# Patient Record
Sex: FEMALE | Race: WHITE | NOT HISPANIC OR LATINO | Employment: OTHER
[De-identification: names, ages, dates, MRNs, and addresses within clinical notes are randomized per-mention and may not be internally consistent; named-entity substitution may affect disease eponyms.]

---

## 2019-09-24 ENCOUNTER — HOSPITAL (OUTPATIENT)
Dept: OTHER | Age: 84
End: 2019-09-24

## 2020-01-03 ENCOUNTER — HOSPITAL (OUTPATIENT)
Dept: OTHER | Age: 85
End: 2020-01-03

## 2020-01-03 ENCOUNTER — DIAGNOSTIC TRANS (OUTPATIENT)
Dept: OTHER | Age: 85
End: 2020-01-03

## 2020-01-03 LAB
AMORPH SED URNS QL MICRO: ABNORMAL
ANALYZER ANC (IANC): ABNORMAL
ANION GAP SERPL CALC-SCNC: 10 MMOL/L (ref 10–20)
APPEARANCE UR: ABNORMAL
BACTERIA #/AREA URNS HPF: ABNORMAL /HPF
BASOPHILS # BLD: 0.1 K/MCL (ref 0–0.3)
BASOPHILS NFR BLD: 1 %
BILIRUB UR QL: NEGATIVE
BUN SERPL-MCNC: 24 MG/DL (ref 6–20)
BUN/CREAT SERPL: 24 (ref 7–25)
CALCIUM SERPL-MCNC: 8.8 MG/DL (ref 8.4–10.2)
CAOX CRY URNS QL MICRO: ABNORMAL
CHLORIDE SERPL-SCNC: 108 MMOL/L (ref 98–107)
CK SERPL-CCNC: 1736 UNITS/L (ref 26–192)
CK SERPL-CCNC: ABNORMAL U/L
CO2 SERPL-SCNC: 23 MMOL/L (ref 21–32)
COLOR UR: YELLOW
CREAT SERPL-MCNC: 1.02 MG/DL (ref 0.51–0.95)
DIFFERENTIAL METHOD BLD: ABNORMAL
EOSINOPHIL # BLD: 0.1 K/MCL (ref 0.1–0.5)
EOSINOPHIL NFR BLD: 1 %
EPITH CASTS #/AREA URNS LPF: ABNORMAL /[LPF]
ERYTHROCYTE [DISTWIDTH] IN BLOOD: 14.5 % (ref 11–15)
FATTY CASTS #/AREA URNS LPF: ABNORMAL /[LPF]
GLUCOSE SERPL-MCNC: 104 MG/DL (ref 65–99)
GLUCOSE UR-MCNC: NEGATIVE MG/DL
GRAN CASTS #/AREA URNS LPF: ABNORMAL /[LPF]
HCT VFR BLD CALC: 44.8 % (ref 36–46.5)
HGB BLD-MCNC: 14.4 G/DL (ref 12–15.5)
HGB UR QL: ABNORMAL
HYALINE CASTS #/AREA URNS LPF: ABNORMAL /LPF (ref 0–5)
IMM GRANULOCYTES # BLD AUTO: 0 K/MCL (ref 0–0.2)
IMM GRANULOCYTES NFR BLD: 0 %
KETONES UR-MCNC: NEGATIVE MG/DL
LEUKOCYTE ESTERASE UR QL STRIP: ABNORMAL
LEUKOCYTE ESTERASE UR QL STRIP: ABNORMAL
LYMPHOCYTES # BLD: 2.1 K/MCL (ref 1–4)
LYMPHOCYTES NFR BLD: 23 %
MCH RBC QN AUTO: 31.4 PG (ref 26–34)
MCHC RBC AUTO-ENTMCNC: 32.1 G/DL (ref 32–36.5)
MCV RBC AUTO: 97.6 FL (ref 78–100)
MICROSCOPIC (MT): ABNORMAL
MIXED CELL CASTS #/AREA URNS LPF: ABNORMAL /[LPF]
MONOCYTES # BLD: 1.3 K/MCL (ref 0.3–0.9)
MONOCYTES NFR BLD: 14 %
MUCOUS THREADS URNS QL MICRO: ABNORMAL
NEUTROPHILS # BLD: 5.6 K/MCL (ref 1.8–7.7)
NEUTROPHILS NFR BLD: 61 %
NEUTS SEG NFR BLD: ABNORMAL %
NITRITE UR QL: ABNORMAL
NITRITE UR QL: POSITIVE
NRBC (NRBCRE): 0 /100 WBC
PH UR: 5 UNITS (ref 5–7)
PLATELET # BLD: 251 K/MCL (ref 140–450)
POTASSIUM SERPL-SCNC: 4.6 MMOL/L (ref 3.4–5.1)
POTASSIUM SERPL-SCNC: ABNORMAL MMOL/L
PROT UR QL: NEGATIVE MG/DL
RBC # BLD: 4.59 MIL/MCL (ref 4–5.2)
RBC #/AREA URNS HPF: ABNORMAL /HPF (ref 0–2)
RBC CASTS #/AREA URNS LPF: ABNORMAL /[LPF]
RENAL EPI CELLS #/AREA URNS HPF: ABNORMAL /[HPF]
SODIUM SERPL-SCNC: 136 MMOL/L (ref 135–145)
SP GR UR: 1.01 (ref 1–1.03)
SPECIMEN SOURCE: ABNORMAL
SPERM URNS QL MICRO: ABNORMAL
SQUAMOUS #/AREA URNS HPF: ABNORMAL /HPF (ref 0–5)
T VAGINALIS URNS QL MICRO: ABNORMAL
TRI-PHOS CRY URNS QL MICRO: ABNORMAL
TROPONIN I SERPL HS-MCNC: 0.09 NG/ML
TROPONIN I SERPL HS-MCNC: ABNORMAL NG/L
TROPONIN I SERPL HS-MCNC: ABNORMAL NG/L
URATE CRY URNS QL MICRO: ABNORMAL
URNS CMNT MICRO: ABNORMAL
UROBILINOGEN UR QL: 0.2 MG/DL (ref 0–1)
WAXY CASTS #/AREA URNS LPF: ABNORMAL /[LPF]
WBC # BLD: 9.2 K/MCL (ref 4.2–11)
WBC #/AREA URNS HPF: ABNORMAL /HPF (ref 0–5)
WBC #/AREA URNS HPF: ABNORMAL /[HPF]
WBC CASTS #/AREA URNS LPF: ABNORMAL /[LPF]
YEAST HYPHAE URNS QL MICRO: ABNORMAL
YEAST URNS QL MICRO: ABNORMAL

## 2020-01-08 LAB
BACTERIA BLD CULT: NORMAL

## 2022-01-19 ENCOUNTER — APPOINTMENT (OUTPATIENT)
Dept: CT IMAGING | Age: 87
DRG: 193 | End: 2022-01-19
Attending: EMERGENCY MEDICINE

## 2022-01-19 ENCOUNTER — APPOINTMENT (OUTPATIENT)
Dept: GENERAL RADIOLOGY | Age: 87
DRG: 193 | End: 2022-01-19
Attending: EMERGENCY MEDICINE

## 2022-01-19 ENCOUNTER — HOSPITAL ENCOUNTER (INPATIENT)
Age: 87
LOS: 2 days | Discharge: HOME | DRG: 193 | End: 2022-01-21
Attending: EMERGENCY MEDICINE | Admitting: INTERNAL MEDICINE

## 2022-01-19 DIAGNOSIS — E87.1 HYPONATREMIA: ICD-10-CM

## 2022-01-19 DIAGNOSIS — B33.8 RSV INFECTION: Primary | ICD-10-CM

## 2022-01-19 LAB
ALBUMIN SERPL-MCNC: 3.3 G/DL (ref 3.6–5.1)
ALBUMIN/GLOB SERPL: 0.8 {RATIO} (ref 1–2.4)
ALP SERPL-CCNC: 84 UNITS/L (ref 45–117)
ALT SERPL-CCNC: 26 UNITS/L
ANION GAP SERPL CALC-SCNC: 12 MMOL/L (ref 10–20)
APPEARANCE UR: CLEAR
AST SERPL-CCNC: 38 UNITS/L
BACTERIA #/AREA URNS HPF: ABNORMAL /HPF
BASOPHILS # BLD: 0.1 K/MCL (ref 0–0.3)
BASOPHILS NFR BLD: 1 %
BILIRUB SERPL-MCNC: 0.4 MG/DL (ref 0.2–1)
BILIRUB UR QL STRIP: NEGATIVE
BUN SERPL-MCNC: 22 MG/DL (ref 6–20)
BUN/CREAT SERPL: 19 (ref 7–25)
CALCIUM SERPL-MCNC: 9.1 MG/DL (ref 8.4–10.2)
CHLORIDE SERPL-SCNC: 96 MMOL/L (ref 98–107)
CO2 SERPL-SCNC: 26 MMOL/L (ref 21–32)
COLOR UR: YELLOW
CREAT SERPL-MCNC: 1.15 MG/DL (ref 0.51–0.95)
DEPRECATED RDW RBC: 50.4 FL (ref 39–50)
EOSINOPHIL # BLD: 0.1 K/MCL (ref 0–0.5)
EOSINOPHIL NFR BLD: 1 %
ERYTHROCYTE [DISTWIDTH] IN BLOOD: 14.1 % (ref 11–15)
FASTING DURATION TIME PATIENT: ABNORMAL H
FLUAV RNA RESP QL NAA+PROBE: NOT DETECTED
FLUBV RNA RESP QL NAA+PROBE: NOT DETECTED
GFR SERPLBLD BASED ON 1.73 SQ M-ARVRAT: 40 ML/MIN
GLOBULIN SER-MCNC: 4 G/DL (ref 2–4)
GLUCOSE SERPL-MCNC: 130 MG/DL (ref 70–99)
GLUCOSE UR STRIP-MCNC: NEGATIVE MG/DL
HCT VFR BLD CALC: 42.1 % (ref 36–46.5)
HGB BLD-MCNC: 13.5 G/DL (ref 12–15.5)
HGB UR QL STRIP: NEGATIVE
HYALINE CASTS #/AREA URNS LPF: ABNORMAL /LPF
IMM GRANULOCYTES # BLD AUTO: 0.1 K/MCL (ref 0–0.2)
IMM GRANULOCYTES # BLD: 1 %
KETONES UR STRIP-MCNC: NEGATIVE MG/DL
LACTATE BLDV-SCNC: 1.7 MMOL/L
LEUKOCYTE ESTERASE UR QL STRIP: NEGATIVE
LYMPHOCYTES # BLD: 1.1 K/MCL (ref 1–4)
LYMPHOCYTES NFR BLD: 10 %
MCH RBC QN AUTO: 31.4 PG (ref 26–34)
MCHC RBC AUTO-ENTMCNC: 32.1 G/DL (ref 32–36.5)
MCV RBC AUTO: 97.9 FL (ref 78–100)
MONOCYTES # BLD: 1.9 K/MCL (ref 0.3–0.9)
MONOCYTES NFR BLD: 17 %
MUCOUS THREADS URNS QL MICRO: PRESENT
NEUTROPHILS # BLD: 8.3 K/MCL (ref 1.8–7.7)
NEUTROPHILS NFR BLD: 70 %
NITRITE UR QL STRIP: NEGATIVE
NRBC BLD MANUAL-RTO: 0 /100 WBC
PH UR STRIP: 6 [PH] (ref 5–7)
PLATELET # BLD AUTO: 271 K/MCL (ref 140–450)
POTASSIUM SERPL-SCNC: 5 MMOL/L (ref 3.4–5.1)
PROT SERPL-MCNC: 7.3 G/DL (ref 6.4–8.2)
PROT UR STRIP-MCNC: 30 MG/DL
RBC # BLD: 4.3 MIL/MCL (ref 4–5.2)
RBC #/AREA URNS HPF: ABNORMAL /HPF
RSV AG NPH QL IA.RAPID: DETECTED
SARS-COV-2 RNA RESP QL NAA+PROBE: NOT DETECTED
SARS-COV-2 RNA RESP QL NAA+PROBE: NOT DETECTED
SERVICE CMNT-IMP: ABNORMAL
SERVICE CMNT-IMP: ABNORMAL
SERVICE CMNT-IMP: NORMAL
SERVICE CMNT-IMP: NORMAL
SODIUM SERPL-SCNC: 129 MMOL/L (ref 135–145)
SP GR UR STRIP: 1.02 (ref 1–1.03)
SQUAMOUS #/AREA URNS HPF: ABNORMAL /HPF
UROBILINOGEN UR STRIP-MCNC: 0.2 MG/DL
WBC # BLD: 11.7 K/MCL (ref 4.2–11)
WBC #/AREA URNS HPF: ABNORMAL /HPF

## 2022-01-19 PROCEDURE — 87635 SARS-COV-2 COVID-19 AMP PRB: CPT | Performed by: EMERGENCY MEDICINE

## 2022-01-19 PROCEDURE — 10004281 HB COUNTER-STAFF TIME PER 15 MIN

## 2022-01-19 PROCEDURE — 87807 RSV ASSAY W/OPTIC: CPT | Performed by: EMERGENCY MEDICINE

## 2022-01-19 PROCEDURE — 93005 ELECTROCARDIOGRAM TRACING: CPT | Performed by: EMERGENCY MEDICINE

## 2022-01-19 PROCEDURE — 10004651 HB RX, NO CHARGE ITEM: Performed by: EMERGENCY MEDICINE

## 2022-01-19 PROCEDURE — 10002807 HB RX 258: Performed by: EMERGENCY MEDICINE

## 2022-01-19 PROCEDURE — 85025 COMPLETE CBC W/AUTO DIFF WBC: CPT | Performed by: EMERGENCY MEDICINE

## 2022-01-19 PROCEDURE — 81001 URINALYSIS AUTO W/SCOPE: CPT | Performed by: EMERGENCY MEDICINE

## 2022-01-19 PROCEDURE — 99285 EMERGENCY DEPT VISIT HI MDM: CPT

## 2022-01-19 PROCEDURE — 71250 CT THORAX DX C-: CPT

## 2022-01-19 PROCEDURE — 0241U COVID/FLU/RSV PANEL: CPT | Performed by: EMERGENCY MEDICINE

## 2022-01-19 PROCEDURE — 87040 BLOOD CULTURE FOR BACTERIA: CPT | Performed by: EMERGENCY MEDICINE

## 2022-01-19 PROCEDURE — 83605 ASSAY OF LACTIC ACID: CPT

## 2022-01-19 PROCEDURE — 36415 COLL VENOUS BLD VENIPUNCTURE: CPT

## 2022-01-19 PROCEDURE — 70450 CT HEAD/BRAIN W/O DYE: CPT

## 2022-01-19 PROCEDURE — 10003585 HB ROOM CHARGE INTERMEDIATE CARE

## 2022-01-19 PROCEDURE — 10002803 HB RX 637: Performed by: INTERNAL MEDICINE

## 2022-01-19 PROCEDURE — 71046 X-RAY EXAM CHEST 2 VIEWS: CPT

## 2022-01-19 PROCEDURE — 80053 COMPREHEN METABOLIC PANEL: CPT | Performed by: EMERGENCY MEDICINE

## 2022-01-19 PROCEDURE — C9803 HOPD COVID-19 SPEC COLLECT: HCPCS

## 2022-01-19 RX ORDER — LEVOTHYROXINE SODIUM 0.07 MG/1
75 TABLET ORAL EVERY OTHER DAY
COMMUNITY

## 2022-01-19 RX ORDER — LEVOTHYROXINE SODIUM 0.07 MG/1
75 TABLET ORAL
Status: DISCONTINUED | OUTPATIENT
Start: 2022-01-20 | End: 2022-01-20

## 2022-01-19 RX ORDER — IBUPROFEN 400 MG/1
400 TABLET ORAL EVERY 6 HOURS PRN
Status: ON HOLD | COMMUNITY
End: 2022-01-21 | Stop reason: HOSPADM

## 2022-01-19 RX ORDER — ALBUTEROL SULFATE 1.25 MG/3ML
1.25 SOLUTION RESPIRATORY (INHALATION)
Status: DISCONTINUED | OUTPATIENT
Start: 2022-01-19 | End: 2022-01-21 | Stop reason: HOSPADM

## 2022-01-19 RX ORDER — ACETAMINOPHEN 500 MG
1000 TABLET ORAL ONCE
Status: COMPLETED | OUTPATIENT
Start: 2022-01-19 | End: 2022-01-19

## 2022-01-19 RX ORDER — LEVOTHYROXINE SODIUM 0.05 MG/1
50 TABLET ORAL EVERY OTHER DAY
COMMUNITY
Start: 2021-10-16

## 2022-01-19 RX ORDER — LISINOPRIL 10 MG/1
10 TABLET ORAL DAILY
COMMUNITY
Start: 2021-11-04

## 2022-01-19 RX ORDER — LISINOPRIL 10 MG/1
10 TABLET ORAL DAILY
Status: DISCONTINUED | OUTPATIENT
Start: 2022-01-20 | End: 2022-01-20

## 2022-01-19 RX ADMIN — SODIUM CHLORIDE 500 ML: 0.9 INJECTION, SOLUTION INTRAVENOUS at 18:56

## 2022-01-19 RX ADMIN — ACETAMINOPHEN 1000 MG: 500 TABLET ORAL at 18:55

## 2022-01-19 RX ADMIN — GUAIFENESIN AND DEXTROMETHORPHAN HYDROBROMIDE 1 TABLET: 600; 30 TABLET, EXTENDED RELEASE ORAL at 23:29

## 2022-01-19 ASSESSMENT — COLUMBIA-SUICIDE SEVERITY RATING SCALE - C-SSRS
6. HAVE YOU EVER DONE ANYTHING, STARTED TO DO ANYTHING, OR PREPARED TO DO ANYTHING TO END YOUR LIFE?: NO
1. WITHIN THE PAST MONTH, HAVE YOU WISHED YOU WERE DEAD OR WISHED YOU COULD GO TO SLEEP AND NOT WAKE UP?: NO
2. HAVE YOU ACTUALLY HAD ANY THOUGHTS OF KILLING YOURSELF?: NO
IS THE PATIENT ABLE TO COMPLETE C-SSRS: YES

## 2022-01-19 ASSESSMENT — LIFESTYLE VARIABLES
HOW OFTEN DO YOU HAVE 6 OR MORE DRINKS ON ONE OCCASION: NEVER
ALCOHOL_USE_STATUS: NO OR LOW RISK WITH VALIDATED TOOL
AUDIT-C TOTAL SCORE: 0
HOW OFTEN DO YOU HAVE A DRINK CONTAINING ALCOHOL: NEVER
HOW MANY STANDARD DRINKS CONTAINING ALCOHOL DO YOU HAVE ON A TYPICAL DAY: 0,1 OR 2

## 2022-01-19 ASSESSMENT — PAIN SCALES - GENERAL: PAINLEVEL_OUTOF10: 0

## 2022-01-19 ASSESSMENT — PATIENT HEALTH QUESTIONNAIRE - PHQ9
2. FEELING DOWN, DEPRESSED OR HOPELESS: NOT AT ALL
SUM OF ALL RESPONSES TO PHQ9 QUESTIONS 1 AND 2: 0
SUM OF ALL RESPONSES TO PHQ9 QUESTIONS 1 AND 2: 0
1. LITTLE INTEREST OR PLEASURE IN DOING THINGS: NOT AT ALL
IS PATIENT ABLE TO COMPLETE PHQ2 OR PHQ9: YES
CLINICAL INTERPRETATION OF PHQ2 SCORE: NO FURTHER SCREENING NEEDED

## 2022-01-19 ASSESSMENT — ENCOUNTER SYMPTOMS
ABDOMINAL PAIN: 0
SHORTNESS OF BREATH: 0
NUMBNESS: 0
COUGH: 1
CONFUSION: 1
EYE PAIN: 0
DIZZINESS: 0
VOMITING: 0
SINUS PAIN: 0
WOUND: 0
CHEST TIGHTNESS: 0
DIARRHEA: 0
WEAKNESS: 0
FEVER: 1
BACK PAIN: 0

## 2022-01-19 ASSESSMENT — ACTIVITIES OF DAILY LIVING (ADL)
RECENT_DECLINE_ADL: YES, ACUTE ILLNESS WITHOUT THERAPY NEEDS
CHRONIC_PAIN_PRESENT: NO
ADL_BEFORE_ADMISSION: INDEPENDENT
ADL_SCORE: 12
ADL_SHORT_OF_BREATH: NO

## 2022-01-19 ASSESSMENT — COGNITIVE AND FUNCTIONAL STATUS - GENERAL
BECAUSE OF A PHYSICAL, MENTAL, OR EMOTIONAL CONDITION, DO YOU HAVE SERIOUS DIFFICULTY CONCENTRATING, REMEMBERING OR MAKING DECISIONS: YES
BECAUSE OF A PHYSICAL, MENTAL, OR EMOTIONAL CONDITION, DO YOU HAVE DIFFICULTY DOING ERRANDS ALONE: YES
ARE YOU DEAF OR DO YOU HAVE SERIOUS DIFFICULTY  HEARING: YES
ARE YOU BLIND OR DO YOU HAVE SERIOUS DIFFICULTY SEEING, EVEN WHEN WEARING GLASSES: NO
DO YOU HAVE SERIOUS DIFFICULTY WALKING OR CLIMBING STAIRS: NO
DO YOU HAVE DIFFICULTY DRESSING OR BATHING: NO

## 2022-01-20 ENCOUNTER — CASE MANAGEMENT (OUTPATIENT)
Dept: CARE COORDINATION | Age: 87
End: 2022-01-20

## 2022-01-20 LAB
ANION GAP SERPL CALC-SCNC: 10 MMOL/L (ref 10–20)
ATRIAL RATE (BPM): 88
BASOPHILS # BLD: 0.1 K/MCL (ref 0–0.3)
BASOPHILS NFR BLD: 1 %
BUN SERPL-MCNC: 20 MG/DL (ref 6–20)
BUN/CREAT SERPL: 22 (ref 7–25)
CALCIUM SERPL-MCNC: 8.5 MG/DL (ref 8.4–10.2)
CHLORIDE SERPL-SCNC: 100 MMOL/L (ref 98–107)
CO2 SERPL-SCNC: 26 MMOL/L (ref 21–32)
CREAT SERPL-MCNC: 0.93 MG/DL (ref 0.51–0.95)
DEPRECATED RDW RBC: 50.7 FL (ref 39–50)
EOSINOPHIL # BLD: 0.3 K/MCL (ref 0–0.5)
EOSINOPHIL NFR BLD: 3 %
ERYTHROCYTE [DISTWIDTH] IN BLOOD: 14.1 % (ref 11–15)
FASTING DURATION TIME PATIENT: ABNORMAL H
GFR SERPLBLD BASED ON 1.73 SQ M-ARVRAT: 52 ML/MIN
GLUCOSE SERPL-MCNC: 100 MG/DL (ref 70–99)
HCT VFR BLD CALC: 36.7 % (ref 36–46.5)
HGB BLD-MCNC: 11.9 G/DL (ref 12–15.5)
IMM GRANULOCYTES # BLD AUTO: 0.1 K/MCL (ref 0–0.2)
IMM GRANULOCYTES # BLD: 1 %
LYMPHOCYTES # BLD: 1.5 K/MCL (ref 1–4)
LYMPHOCYTES NFR BLD: 17 %
MCH RBC QN AUTO: 31.6 PG (ref 26–34)
MCHC RBC AUTO-ENTMCNC: 32.4 G/DL (ref 32–36.5)
MCV RBC AUTO: 97.6 FL (ref 78–100)
MONOCYTES # BLD: 1.6 K/MCL (ref 0.3–0.9)
MONOCYTES NFR BLD: 20 %
NEUTROPHILS # BLD: 4.8 K/MCL (ref 1.8–7.7)
NEUTROPHILS NFR BLD: 58 %
NRBC BLD MANUAL-RTO: 0 /100 WBC
P AXIS (DEGREES): 4
PLATELET # BLD AUTO: 236 K/MCL (ref 140–450)
POTASSIUM SERPL-SCNC: 3.8 MMOL/L (ref 3.4–5.1)
PR-INTERVAL (MSEC): 172
QRS-INTERVAL (MSEC): 114
QT-INTERVAL (MSEC): 380
QTC: 460
R AXIS (DEGREES): -12
RBC # BLD: 3.76 MIL/MCL (ref 4–5.2)
REPORT TEXT: NORMAL
SODIUM SERPL-SCNC: 132 MMOL/L (ref 135–145)
T AXIS (DEGREES): 13
VENTRICULAR RATE EKG/MIN (BPM): 88
WBC # BLD: 8.3 K/MCL (ref 4.2–11)

## 2022-01-20 PROCEDURE — 10003585 HB ROOM CHARGE INTERMEDIATE CARE

## 2022-01-20 PROCEDURE — 10002803 HB RX 637: Performed by: INTERNAL MEDICINE

## 2022-01-20 PROCEDURE — 36415 COLL VENOUS BLD VENIPUNCTURE: CPT | Performed by: INTERNAL MEDICINE

## 2022-01-20 PROCEDURE — 13003289 HB OXYGEN THERAPY DAILY

## 2022-01-20 PROCEDURE — 85025 COMPLETE CBC W/AUTO DIFF WBC: CPT | Performed by: INTERNAL MEDICINE

## 2022-01-20 PROCEDURE — 80048 BASIC METABOLIC PNL TOTAL CA: CPT | Performed by: INTERNAL MEDICINE

## 2022-01-20 RX ORDER — MULTIVITAMIN
1 TABLET ORAL DAILY
COMMUNITY

## 2022-01-20 RX ORDER — LISINOPRIL 5 MG/1
5 TABLET ORAL DAILY
COMMUNITY

## 2022-01-20 RX ORDER — LEVOTHYROXINE SODIUM 0.05 MG/1
50 TABLET ORAL
Status: DISCONTINUED | OUTPATIENT
Start: 2022-01-21 | End: 2022-01-21 | Stop reason: HOSPADM

## 2022-01-20 RX ORDER — LEVOTHYROXINE SODIUM 0.07 MG/1
75 TABLET ORAL
Status: DISCONTINUED | OUTPATIENT
Start: 2022-01-20 | End: 2022-01-21 | Stop reason: HOSPADM

## 2022-01-20 RX ADMIN — LISINOPRIL 15 MG: 5 TABLET ORAL at 10:51

## 2022-01-20 RX ADMIN — GUAIFENESIN AND DEXTROMETHORPHAN HYDROBROMIDE 1 TABLET: 600; 30 TABLET, EXTENDED RELEASE ORAL at 10:51

## 2022-01-20 RX ADMIN — GUAIFENESIN AND DEXTROMETHORPHAN HYDROBROMIDE 1 TABLET: 600; 30 TABLET, EXTENDED RELEASE ORAL at 20:24

## 2022-01-20 RX ADMIN — Medication 125 MG: at 18:47

## 2022-01-20 RX ADMIN — LEVOTHYROXINE SODIUM 75 MCG: 75 TABLET ORAL at 12:30

## 2022-01-20 RX ADMIN — Medication 125 MG: at 20:24

## 2022-01-20 ASSESSMENT — PAIN SCALES - GENERAL
PAINLEVEL_OUTOF10: 0
PAINLEVEL_OUTOF10: 0

## 2022-01-21 VITALS
BODY MASS INDEX: 31.8 KG/M2 | HEIGHT: 64 IN | SYSTOLIC BLOOD PRESSURE: 123 MMHG | RESPIRATION RATE: 18 BRPM | WEIGHT: 186.29 LBS | OXYGEN SATURATION: 91 % | DIASTOLIC BLOOD PRESSURE: 72 MMHG | HEART RATE: 87 BPM | TEMPERATURE: 97.2 F

## 2022-01-21 LAB
BASOPHILS # BLD: 0.1 K/MCL (ref 0–0.3)
BASOPHILS NFR BLD: 1 %
DEPRECATED RDW RBC: 50.4 FL (ref 39–50)
EOSINOPHIL # BLD: 0.2 K/MCL (ref 0–0.5)
EOSINOPHIL NFR BLD: 2 %
ERYTHROCYTE [DISTWIDTH] IN BLOOD: 14 % (ref 11–15)
HCT VFR BLD CALC: 43.3 % (ref 36–46.5)
HGB BLD-MCNC: 14 G/DL (ref 12–15.5)
IMM GRANULOCYTES # BLD AUTO: 0.1 K/MCL (ref 0–0.2)
IMM GRANULOCYTES # BLD: 1 %
LYMPHOCYTES # BLD: 2.6 K/MCL (ref 1–4)
LYMPHOCYTES NFR BLD: 22 %
MCH RBC QN AUTO: 31.4 PG (ref 26–34)
MCHC RBC AUTO-ENTMCNC: 32.3 G/DL (ref 32–36.5)
MCV RBC AUTO: 97.1 FL (ref 78–100)
MONOCYTES # BLD: 1.6 K/MCL (ref 0.3–0.9)
MONOCYTES NFR BLD: 14 %
NEUTROPHILS # BLD: 7.1 K/MCL (ref 1.8–7.7)
NEUTROPHILS NFR BLD: 60 %
NRBC BLD MANUAL-RTO: 0 /100 WBC
PLATELET # BLD AUTO: 265 K/MCL (ref 140–450)
RAINBOW EXTRA TUBES HOLD SPECIMEN: NORMAL
RBC # BLD: 4.46 MIL/MCL (ref 4–5.2)
WBC # BLD: 11.7 K/MCL (ref 4.2–11)

## 2022-01-21 PROCEDURE — 13003289 HB OXYGEN THERAPY DAILY

## 2022-01-21 PROCEDURE — 36415 COLL VENOUS BLD VENIPUNCTURE: CPT | Performed by: INTERNAL MEDICINE

## 2022-01-21 PROCEDURE — 10002803 HB RX 637: Performed by: INTERNAL MEDICINE

## 2022-01-21 PROCEDURE — 85025 COMPLETE CBC W/AUTO DIFF WBC: CPT | Performed by: INTERNAL MEDICINE

## 2022-01-21 RX ADMIN — GUAIFENESIN AND DEXTROMETHORPHAN HYDROBROMIDE 1 TABLET: 600; 30 TABLET, EXTENDED RELEASE ORAL at 08:37

## 2022-01-21 RX ADMIN — Medication 125 MG: at 08:36

## 2022-01-21 RX ADMIN — LEVOTHYROXINE SODIUM 50 MCG: 50 TABLET ORAL at 05:54

## 2022-01-21 RX ADMIN — LISINOPRIL 15 MG: 5 TABLET ORAL at 08:37

## 2022-01-21 ASSESSMENT — PAIN SCALES - GENERAL: PAINLEVEL_OUTOF10: 0

## 2022-01-24 ENCOUNTER — CASE MANAGEMENT (OUTPATIENT)
Dept: CARE COORDINATION | Age: 87
End: 2022-01-24

## 2022-01-24 LAB
BACTERIA BLD CULT: NORMAL
BACTERIA BLD CULT: NORMAL

## 2022-01-26 ENCOUNTER — CASE MANAGEMENT (OUTPATIENT)
Dept: CARE COORDINATION | Age: 87
End: 2022-01-26

## 2022-01-28 ENCOUNTER — CASE MANAGEMENT (OUTPATIENT)
Dept: CARE COORDINATION | Age: 87
End: 2022-01-28

## 2022-01-31 ENCOUNTER — CASE MANAGEMENT (OUTPATIENT)
Dept: CARE COORDINATION | Age: 87
End: 2022-01-31

## 2022-08-18 ENCOUNTER — HOSPITAL ENCOUNTER (INPATIENT)
Age: 87
LOS: 2 days | Discharge: NURSING, REHAB OR LONG-TERM CARE | DRG: 206 | End: 2022-08-21
Attending: EMERGENCY MEDICINE | Admitting: INTERNAL MEDICINE

## 2022-08-18 ENCOUNTER — APPOINTMENT (OUTPATIENT)
Dept: GENERAL RADIOLOGY | Age: 87
DRG: 206 | End: 2022-08-18
Attending: EMERGENCY MEDICINE

## 2022-08-18 ENCOUNTER — APPOINTMENT (OUTPATIENT)
Dept: CT IMAGING | Age: 87
DRG: 206 | End: 2022-08-18
Attending: EMERGENCY MEDICINE

## 2022-08-18 DIAGNOSIS — R52 INTRACTABLE PAIN: ICD-10-CM

## 2022-08-18 DIAGNOSIS — S22.32XA CLOSED FRACTURE OF ONE RIB OF LEFT SIDE, INITIAL ENCOUNTER: Primary | ICD-10-CM

## 2022-08-18 LAB
ANION GAP SERPL CALC-SCNC: 9 MMOL/L (ref 7–19)
BASOPHILS # BLD: 0.1 K/MCL (ref 0–0.3)
BASOPHILS NFR BLD: 1 %
BUN SERPL-MCNC: 26 MG/DL (ref 6–20)
BUN/CREAT SERPL: 25 (ref 7–25)
CALCIUM SERPL-MCNC: 9.3 MG/DL (ref 8.4–10.2)
CHLORIDE SERPL-SCNC: 102 MMOL/L (ref 97–110)
CO2 SERPL-SCNC: 28 MMOL/L (ref 21–32)
CREAT SERPL-MCNC: 1.03 MG/DL (ref 0.51–0.95)
DEPRECATED RDW RBC: 51.8 FL (ref 39–50)
EOSINOPHIL # BLD: 0.2 K/MCL (ref 0–0.5)
EOSINOPHIL NFR BLD: 2 %
ERYTHROCYTE [DISTWIDTH] IN BLOOD: 14.1 % (ref 11–15)
FASTING DURATION TIME PATIENT: ABNORMAL H
GFR SERPLBLD BASED ON 1.73 SQ M-ARVRAT: 49 ML/MIN
GLUCOSE SERPL-MCNC: 114 MG/DL (ref 70–99)
HCT VFR BLD CALC: 46.5 % (ref 36–46.5)
HGB BLD-MCNC: 15.1 G/DL (ref 12–15.5)
IMM GRANULOCYTES # BLD AUTO: 0.1 K/MCL (ref 0–0.2)
IMM GRANULOCYTES # BLD: 1 %
LYMPHOCYTES # BLD: 1.6 K/MCL (ref 1–4)
LYMPHOCYTES NFR BLD: 18 %
MCH RBC QN AUTO: 32.1 PG (ref 26–34)
MCHC RBC AUTO-ENTMCNC: 32.5 G/DL (ref 32–36.5)
MCV RBC AUTO: 98.9 FL (ref 78–100)
MONOCYTES # BLD: 1 K/MCL (ref 0.3–0.9)
MONOCYTES NFR BLD: 11 %
NEUTROPHILS # BLD: 5.9 K/MCL (ref 1.8–7.7)
NEUTROPHILS NFR BLD: 67 %
NRBC BLD MANUAL-RTO: 0 /100 WBC
PLATELET # BLD AUTO: 259 K/MCL (ref 140–450)
POTASSIUM SERPL-SCNC: 4.3 MMOL/L (ref 3.4–5.1)
RBC # BLD: 4.7 MIL/MCL (ref 4–5.2)
SARS-COV-2 RNA RESP QL NAA+PROBE: NOT DETECTED
SERVICE CMNT-IMP: NORMAL
SERVICE CMNT-IMP: NORMAL
SODIUM SERPL-SCNC: 135 MMOL/L (ref 135–145)
WBC # BLD: 8.9 K/MCL (ref 4.2–11)

## 2022-08-18 PROCEDURE — G0378 HOSPITAL OBSERVATION PER HR: HCPCS

## 2022-08-18 PROCEDURE — 96375 TX/PRO/DX INJ NEW DRUG ADDON: CPT

## 2022-08-18 PROCEDURE — 10002805 HB CONTRAST AGENT: Performed by: EMERGENCY MEDICINE

## 2022-08-18 PROCEDURE — 96361 HYDRATE IV INFUSION ADD-ON: CPT

## 2022-08-18 PROCEDURE — 10002803 HB RX 637: Performed by: INTERNAL MEDICINE

## 2022-08-18 PROCEDURE — 87635 SARS-COV-2 COVID-19 AMP PRB: CPT | Performed by: PHYSICIAN ASSISTANT

## 2022-08-18 PROCEDURE — 71101 X-RAY EXAM UNILAT RIBS/CHEST: CPT

## 2022-08-18 PROCEDURE — 99284 EMERGENCY DEPT VISIT MOD MDM: CPT

## 2022-08-18 PROCEDURE — 74177 CT ABD & PELVIS W/CONTRAST: CPT

## 2022-08-18 PROCEDURE — G1004 CDSM NDSC: HCPCS

## 2022-08-18 PROCEDURE — 10002801 HB RX 250 W/O HCPCS: Performed by: EMERGENCY MEDICINE

## 2022-08-18 PROCEDURE — 10002800 HB RX 250 W HCPCS: Performed by: EMERGENCY MEDICINE

## 2022-08-18 PROCEDURE — 80048 BASIC METABOLIC PNL TOTAL CA: CPT | Performed by: EMERGENCY MEDICINE

## 2022-08-18 PROCEDURE — 85025 COMPLETE CBC W/AUTO DIFF WBC: CPT | Performed by: EMERGENCY MEDICINE

## 2022-08-18 PROCEDURE — C9803 HOPD COVID-19 SPEC COLLECT: HCPCS

## 2022-08-18 PROCEDURE — 13003289 HB OXYGEN THERAPY DAILY

## 2022-08-18 PROCEDURE — 96374 THER/PROPH/DIAG INJ IV PUSH: CPT

## 2022-08-18 PROCEDURE — 10002807 HB RX 258: Performed by: EMERGENCY MEDICINE

## 2022-08-18 RX ORDER — LEVOTHYROXINE SODIUM 0.05 MG/1
50 TABLET ORAL EVERY OTHER DAY
Status: DISCONTINUED | OUTPATIENT
Start: 2022-08-20 | End: 2022-08-21 | Stop reason: HOSPADM

## 2022-08-18 RX ORDER — HYDROCODONE BITARTRATE AND ACETAMINOPHEN 5; 325 MG/1; MG/1
0.5 TABLET ORAL EVERY 8 HOURS PRN
Qty: 8 TABLET | Refills: 0 | Status: SHIPPED | OUTPATIENT
Start: 2022-08-18

## 2022-08-18 RX ORDER — LISINOPRIL 2.5 MG/1
5 TABLET ORAL DAILY
Status: DISCONTINUED | OUTPATIENT
Start: 2022-08-18 | End: 2022-08-21 | Stop reason: HOSPADM

## 2022-08-18 RX ORDER — TRAMADOL HYDROCHLORIDE 50 MG/1
25 TABLET ORAL EVERY 4 HOURS PRN
Status: DISCONTINUED | OUTPATIENT
Start: 2022-08-18 | End: 2022-08-20

## 2022-08-18 RX ORDER — LISINOPRIL 10 MG/1
10 TABLET ORAL DAILY
Status: DISCONTINUED | OUTPATIENT
Start: 2022-08-18 | End: 2022-08-21 | Stop reason: HOSPADM

## 2022-08-18 RX ORDER — LEVOTHYROXINE SODIUM 0.07 MG/1
75 TABLET ORAL EVERY OTHER DAY
Status: DISCONTINUED | OUTPATIENT
Start: 2022-08-19 | End: 2022-08-21 | Stop reason: HOSPADM

## 2022-08-18 RX ORDER — LIDOCAINE 50 MG/G
1 PATCH TOPICAL EVERY 24 HOURS
Qty: 14 PATCH | Refills: 0 | Status: SHIPPED | OUTPATIENT
Start: 2022-08-18 | End: 2022-09-01

## 2022-08-18 RX ADMIN — SODIUM CHLORIDE 500 ML: 0.9 INJECTION, SOLUTION INTRAVENOUS at 15:51

## 2022-08-18 RX ADMIN — LISINOPRIL 10 MG: 2.5 TABLET ORAL at 20:14

## 2022-08-18 RX ADMIN — TRAMADOL HYDROCHLORIDE 25 MG: 50 TABLET, COATED ORAL at 20:15

## 2022-08-18 RX ADMIN — LISINOPRIL 5 MG: 2.5 TABLET ORAL at 20:15

## 2022-08-18 RX ADMIN — FENTANYL CITRATE 25 MCG: 50 INJECTION INTRAMUSCULAR; INTRAVENOUS at 15:51

## 2022-08-18 RX ADMIN — MORPHINE SULFATE 1 MG: 2 INJECTION, SOLUTION INTRAMUSCULAR; INTRAVENOUS at 17:50

## 2022-08-18 RX ADMIN — IOHEXOL 75 ML: 350 INJECTION, SOLUTION INTRAVENOUS at 16:22

## 2022-08-18 ASSESSMENT — ACTIVITIES OF DAILY LIVING (ADL)
RECENT_DECLINE_ADL: NO
ADL_BEFORE_ADMISSION: INDEPENDENT
CHRONIC_PAIN_PRESENT: NO
ADL_SHORT_OF_BREATH: NO
MOBILITY_ASSIST_DEVICES: FOUR WHEEL WALKER
ADL_SCORE: 12

## 2022-08-18 ASSESSMENT — PAIN SCALES - GENERAL
PAINLEVEL_OUTOF10: 6
PAINLEVEL_OUTOF10: 4
PAINLEVEL_OUTOF10: 5

## 2022-08-18 ASSESSMENT — LIFESTYLE VARIABLES
HOW OFTEN DO YOU HAVE 6 OR MORE DRINKS ON ONE OCCASION: NEVER
ALCOHOL_USE_STATUS: NO OR LOW RISK WITH VALIDATED TOOL
AUDIT-C TOTAL SCORE: 0
HOW MANY STANDARD DRINKS CONTAINING ALCOHOL DO YOU HAVE ON A TYPICAL DAY: 0,1 OR 2
HOW MANY STANDARD DRINKS CONTAINING ALCOHOL DO YOU HAVE ON A TYPICAL DAY: 0,1 OR 2
HOW OFTEN DO YOU HAVE A DRINK CONTAINING ALCOHOL: NEVER
HOW OFTEN DO YOU HAVE 6 OR MORE DRINKS ON ONE OCCASION: NEVER

## 2022-08-18 ASSESSMENT — COGNITIVE AND FUNCTIONAL STATUS - GENERAL
BECAUSE OF A PHYSICAL, MENTAL, OR EMOTIONAL CONDITION, DO YOU HAVE SERIOUS DIFFICULTY CONCENTRATING, REMEMBERING OR MAKING DECISIONS: NO
ARE YOU DEAF OR DO YOU HAVE SERIOUS DIFFICULTY  HEARING: NO
BECAUSE OF A PHYSICAL, MENTAL, OR EMOTIONAL CONDITION, DO YOU HAVE DIFFICULTY DOING ERRANDS ALONE: NO
DO YOU HAVE SERIOUS DIFFICULTY WALKING OR CLIMBING STAIRS: YES
ARE YOU BLIND OR DO YOU HAVE SERIOUS DIFFICULTY SEEING, EVEN WHEN WEARING GLASSES: NO
DO YOU HAVE DIFFICULTY DRESSING OR BATHING: NO

## 2022-08-19 ENCOUNTER — APPOINTMENT (OUTPATIENT)
Dept: GENERAL RADIOLOGY | Age: 87
DRG: 206 | End: 2022-08-19
Attending: INTERNAL MEDICINE

## 2022-08-19 PROCEDURE — 10002803 HB RX 637: Performed by: ANESTHESIOLOGY

## 2022-08-19 PROCEDURE — 73560 X-RAY EXAM OF KNEE 1 OR 2: CPT

## 2022-08-19 PROCEDURE — 13003289 HB OXYGEN THERAPY DAILY

## 2022-08-19 PROCEDURE — 73502 X-RAY EXAM HIP UNI 2-3 VIEWS: CPT

## 2022-08-19 PROCEDURE — 97165 OT EVAL LOW COMPLEX 30 MIN: CPT

## 2022-08-19 PROCEDURE — 97162 PT EVAL MOD COMPLEX 30 MIN: CPT

## 2022-08-19 PROCEDURE — G0378 HOSPITAL OBSERVATION PER HR: HCPCS

## 2022-08-19 PROCEDURE — 10000002 HB ROOM CHARGE MED SURG

## 2022-08-19 PROCEDURE — 73630 X-RAY EXAM OF FOOT: CPT

## 2022-08-19 PROCEDURE — 97530 THERAPEUTIC ACTIVITIES: CPT

## 2022-08-19 PROCEDURE — 10002803 HB RX 637: Performed by: INTERNAL MEDICINE

## 2022-08-19 PROCEDURE — 73610 X-RAY EXAM OF ANKLE: CPT

## 2022-08-19 RX ORDER — LIDOCAINE 4 G/G
1 PATCH TOPICAL DAILY
Status: DISCONTINUED | OUTPATIENT
Start: 2022-08-19 | End: 2022-08-21 | Stop reason: HOSPADM

## 2022-08-19 RX ADMIN — LISINOPRIL 5 MG: 2.5 TABLET ORAL at 09:24

## 2022-08-19 RX ADMIN — LEVOTHYROXINE SODIUM 75 MCG: 0.07 TABLET ORAL at 06:14

## 2022-08-19 RX ADMIN — LISINOPRIL 10 MG: 2.5 TABLET ORAL at 09:22

## 2022-08-19 RX ADMIN — TRAMADOL HYDROCHLORIDE 25 MG: 50 TABLET, COATED ORAL at 09:22

## 2022-08-19 RX ADMIN — LIDOCAINE 1 PATCH: 4 PATCH TOPICAL at 14:11

## 2022-08-19 ASSESSMENT — PAIN SCALES - PAIN ASSESSMENT IN ADVANCED DEMENTIA (PAINAD)
FACIALEXPRESSION: SMILING OR INEXPRESSIVE
TOTALSCORE: 0
BREATHING: NORMAL
CONSOLABILITY: NO NEED TO CONSOLE
BODYLANGUAGE: RELAXED

## 2022-08-19 ASSESSMENT — ENCOUNTER SYMPTOMS
CHILLS: 0
COUGH: 1
FEVER: 0

## 2022-08-19 ASSESSMENT — COGNITIVE AND FUNCTIONAL STATUS - GENERAL
DO YOU HAVE SERIOUS DIFFICULTY WALKING OR CLIMBING STAIRS: YES
DAILY_ACTIVITY_CONVERTED_SCORE: 29.04
BASIC_MOBILITY_RAW_SCORE: 6
DO YOU HAVE DIFFICULTY DRESSING OR BATHING: NO
DAILY_ACTIVITY_RAW_SCORE: 11
HELP NEEDED FOR BATHING: A LOT
HELP NEEDED DRESSING REGULAR LOWER BODY CLOTHING: TOTAL
BECAUSE OF A PHYSICAL, MENTAL, OR EMOTIONAL CONDITION, DO YOU HAVE DIFFICULTY DOING ERRANDS ALONE: NO
BECAUSE OF A PHYSICAL, MENTAL, OR EMOTIONAL CONDITION, DO YOU HAVE SERIOUS DIFFICULTY CONCENTRATING, REMEMBERING OR MAKING DECISIONS: NO
HELP NEEDED FOR TOILETING: TOTAL
HELP NEEDED FOR PERSONAL GROOMING: A LOT
BASIC_MOBILITY_CONVERTED_SCORE: 16.59
HELP NEEDED DRESSING REGULAR UPPER BODY CLOTHING: A LOT

## 2022-08-19 ASSESSMENT — ACTIVITIES OF DAILY LIVING (ADL)
PRIOR_ADL: MINIMAL ASSIST (MIN)
PRIOR_ADL_BATHING: MODERATE ASSIST (MOD)
PRIOR_ADL_TOILETING: MODIFIED INDEPENDENT

## 2022-08-19 ASSESSMENT — PAIN SCALES - GENERAL
PAINLEVEL_OUTOF10: 10
PAINLEVEL_OUTOF10: 5

## 2022-08-20 ENCOUNTER — APPOINTMENT (OUTPATIENT)
Dept: CT IMAGING | Age: 87
DRG: 206 | End: 2022-08-20
Attending: INTERNAL MEDICINE

## 2022-08-20 LAB
ANION GAP SERPL CALC-SCNC: 12 MMOL/L (ref 7–19)
BASOPHILS # BLD: 0.1 K/MCL (ref 0–0.3)
BASOPHILS NFR BLD: 1 %
BUN SERPL-MCNC: 22 MG/DL (ref 6–20)
BUN/CREAT SERPL: 21 (ref 7–25)
CALCIUM SERPL-MCNC: 9.2 MG/DL (ref 8.4–10.2)
CHLORIDE SERPL-SCNC: 101 MMOL/L (ref 97–110)
CO2 SERPL-SCNC: 25 MMOL/L (ref 21–32)
CREAT SERPL-MCNC: 1.04 MG/DL (ref 0.51–0.95)
DEPRECATED RDW RBC: 49.4 FL (ref 39–50)
EOSINOPHIL # BLD: 0 K/MCL (ref 0–0.5)
EOSINOPHIL NFR BLD: 0 %
ERYTHROCYTE [DISTWIDTH] IN BLOOD: 14.1 % (ref 11–15)
FASTING DURATION TIME PATIENT: ABNORMAL H
GFR SERPLBLD BASED ON 1.73 SQ M-ARVRAT: 49 ML/MIN
GLUCOSE SERPL-MCNC: 141 MG/DL (ref 70–99)
HCT VFR BLD CALC: 47.8 % (ref 36–46.5)
HGB BLD-MCNC: 15.9 G/DL (ref 12–15.5)
IMM GRANULOCYTES # BLD AUTO: 0.1 K/MCL (ref 0–0.2)
IMM GRANULOCYTES # BLD: 1 %
LYMPHOCYTES # BLD: 1.4 K/MCL (ref 1–4)
LYMPHOCYTES NFR BLD: 8 %
MCH RBC QN AUTO: 31.9 PG (ref 26–34)
MCHC RBC AUTO-ENTMCNC: 33.3 G/DL (ref 32–36.5)
MCV RBC AUTO: 96 FL (ref 78–100)
MONOCYTES # BLD: 2.2 K/MCL (ref 0.3–0.9)
MONOCYTES NFR BLD: 13 %
NEUTROPHILS # BLD: 12.8 K/MCL (ref 1.8–7.7)
NEUTROPHILS NFR BLD: 77 %
NRBC BLD MANUAL-RTO: 0 /100 WBC
PLATELET # BLD AUTO: 237 K/MCL (ref 140–450)
POTASSIUM SERPL-SCNC: 4.6 MMOL/L (ref 3.4–5.1)
RBC # BLD: 4.98 MIL/MCL (ref 4–5.2)
SARS-COV-2 RNA RESP QL NAA+PROBE: NOT DETECTED
SERVICE CMNT-IMP: NORMAL
SERVICE CMNT-IMP: NORMAL
SODIUM SERPL-SCNC: 133 MMOL/L (ref 135–145)
WBC # BLD: 16.6 K/MCL (ref 4.2–11)

## 2022-08-20 PROCEDURE — 10002803 HB RX 637: Performed by: INTERNAL MEDICINE

## 2022-08-20 PROCEDURE — 13003289 HB OXYGEN THERAPY DAILY

## 2022-08-20 PROCEDURE — 80048 BASIC METABOLIC PNL TOTAL CA: CPT | Performed by: INTERNAL MEDICINE

## 2022-08-20 PROCEDURE — 36415 COLL VENOUS BLD VENIPUNCTURE: CPT | Performed by: INTERNAL MEDICINE

## 2022-08-20 PROCEDURE — 10002801 HB RX 250 W/O HCPCS: Performed by: INTERNAL MEDICINE

## 2022-08-20 PROCEDURE — 87635 SARS-COV-2 COVID-19 AMP PRB: CPT | Performed by: INTERNAL MEDICINE

## 2022-08-20 PROCEDURE — 85025 COMPLETE CBC W/AUTO DIFF WBC: CPT | Performed by: INTERNAL MEDICINE

## 2022-08-20 PROCEDURE — 10002803 HB RX 637: Performed by: ANESTHESIOLOGY

## 2022-08-20 PROCEDURE — G1004 CDSM NDSC: HCPCS

## 2022-08-20 PROCEDURE — 10000002 HB ROOM CHARGE MED SURG

## 2022-08-20 PROCEDURE — 70450 CT HEAD/BRAIN W/O DYE: CPT

## 2022-08-20 RX ORDER — DEXTROSE AND SODIUM CHLORIDE 5; .45 G/100ML; G/100ML
INJECTION, SOLUTION INTRAVENOUS CONTINUOUS
Status: DISCONTINUED | OUTPATIENT
Start: 2022-08-20 | End: 2022-08-21 | Stop reason: HOSPADM

## 2022-08-20 RX ORDER — LORAZEPAM 2 MG/ML
1 CONCENTRATE ORAL EVERY 8 HOURS PRN
Status: DISCONTINUED | OUTPATIENT
Start: 2022-08-20 | End: 2022-08-21 | Stop reason: HOSPADM

## 2022-08-20 RX ADMIN — LISINOPRIL 5 MG: 2.5 TABLET ORAL at 09:43

## 2022-08-20 RX ADMIN — LIDOCAINE 1 PATCH: 4 PATCH TOPICAL at 09:45

## 2022-08-20 RX ADMIN — DEXTROSE AND SODIUM CHLORIDE: 5; 450 INJECTION, SOLUTION INTRAVENOUS at 17:58

## 2022-08-20 RX ADMIN — LEVOTHYROXINE SODIUM 50 MCG: 0.05 TABLET ORAL at 05:17

## 2022-08-20 RX ADMIN — LORAZEPAM 1 MG: 2 LIQUID ORAL at 18:55

## 2022-08-20 RX ADMIN — TRAMADOL HYDROCHLORIDE 25 MG: 50 TABLET, COATED ORAL at 14:05

## 2022-08-20 RX ADMIN — LISINOPRIL 10 MG: 2.5 TABLET ORAL at 09:42

## 2022-08-20 ASSESSMENT — PAIN SCALES - PAIN ASSESSMENT IN ADVANCED DEMENTIA (PAINAD)
FACIALEXPRESSION: SMILING OR INEXPRESSIVE
TOTALSCORE: 6
TOTALSCORE: 0
BREATHING: NORMAL
NEGVOCALIZATION: OCCASIONAL MOAN OR GROAN, LOW LEVELS OF SPEECH WITH A NEGATIVE OR DISAPPROVING QUALITY
FACIALEXPRESSION: SMILING OR INEXPRESSIVE
BREATHING: OCCASIONAL LABORED BREATHING, SHORT PERIOD OF HYPERVENTILATION
BODYLANGUAGE: TENSE, DISTRESSED, FIDGETING
CONSOLABILITY: NO NEED TO CONSOLE
BODYLANGUAGE: RELAXED
TOTALSCORE: 2
NEGVOCALIZATION: OCCASIONAL MOAN OR GROAN, LOW LEVELS OF SPEECH WITH A NEGATIVE OR DISAPPROVING QUALITY
BODYLANGUAGE: TENSE, DISTRESSED, FIDGETING
CONSOLABILITY: UNABLE TO CONSOLE, DISTRACT OR REASSURE
FACIALEXPRESSION: SAD. FRIGHTENED. FROWNING
BREATHING: NORMAL
CONSOLABILITY: NO NEED TO CONSOLE

## 2022-08-21 VITALS
HEIGHT: 64 IN | TEMPERATURE: 98.1 F | SYSTOLIC BLOOD PRESSURE: 144 MMHG | HEART RATE: 75 BPM | BODY MASS INDEX: 31.8 KG/M2 | OXYGEN SATURATION: 96 % | WEIGHT: 186.29 LBS | DIASTOLIC BLOOD PRESSURE: 84 MMHG | RESPIRATION RATE: 18 BRPM

## 2022-08-21 LAB
ANION GAP SERPL CALC-SCNC: 10 MMOL/L (ref 7–19)
BASOPHILS # BLD: 0.1 K/MCL (ref 0–0.3)
BASOPHILS NFR BLD: 0 %
BUN SERPL-MCNC: 19 MG/DL (ref 6–20)
BUN/CREAT SERPL: 23 (ref 7–25)
CALCIUM SERPL-MCNC: 8.5 MG/DL (ref 8.4–10.2)
CHLORIDE SERPL-SCNC: 100 MMOL/L (ref 97–110)
CO2 SERPL-SCNC: 26 MMOL/L (ref 21–32)
CREAT SERPL-MCNC: 0.83 MG/DL (ref 0.51–0.95)
DEPRECATED RDW RBC: 50.7 FL (ref 39–50)
EOSINOPHIL # BLD: 0.2 K/MCL (ref 0–0.5)
EOSINOPHIL NFR BLD: 2 %
ERYTHROCYTE [DISTWIDTH] IN BLOOD: 13.8 % (ref 11–15)
FASTING DURATION TIME PATIENT: ABNORMAL H
GFR SERPLBLD BASED ON 1.73 SQ M-ARVRAT: 64 ML/MIN
GLUCOSE SERPL-MCNC: 126 MG/DL (ref 70–99)
HCT VFR BLD CALC: 44.4 % (ref 36–46.5)
HGB BLD-MCNC: 14.2 G/DL (ref 12–15.5)
IMM GRANULOCYTES # BLD AUTO: 0.1 K/MCL (ref 0–0.2)
IMM GRANULOCYTES # BLD: 1 %
LYMPHOCYTES # BLD: 1.5 K/MCL (ref 1–4)
LYMPHOCYTES NFR BLD: 13 %
MCH RBC QN AUTO: 31.4 PG (ref 26–34)
MCHC RBC AUTO-ENTMCNC: 32 G/DL (ref 32–36.5)
MCV RBC AUTO: 98.2 FL (ref 78–100)
MONOCYTES # BLD: 1.8 K/MCL (ref 0.3–0.9)
MONOCYTES NFR BLD: 16 %
NEUTROPHILS # BLD: 7.7 K/MCL (ref 1.8–7.7)
NEUTROPHILS NFR BLD: 68 %
NRBC BLD MANUAL-RTO: 0 /100 WBC
PLATELET # BLD AUTO: 205 K/MCL (ref 140–450)
POTASSIUM SERPL-SCNC: 4.1 MMOL/L (ref 3.4–5.1)
RBC # BLD: 4.52 MIL/MCL (ref 4–5.2)
SODIUM SERPL-SCNC: 132 MMOL/L (ref 135–145)
WBC # BLD: 11.3 K/MCL (ref 4.2–11)

## 2022-08-21 PROCEDURE — 10002803 HB RX 637: Performed by: INTERNAL MEDICINE

## 2022-08-21 PROCEDURE — 85025 COMPLETE CBC W/AUTO DIFF WBC: CPT | Performed by: INTERNAL MEDICINE

## 2022-08-21 PROCEDURE — 80048 BASIC METABOLIC PNL TOTAL CA: CPT | Performed by: INTERNAL MEDICINE

## 2022-08-21 PROCEDURE — 36415 COLL VENOUS BLD VENIPUNCTURE: CPT | Performed by: INTERNAL MEDICINE

## 2022-08-21 PROCEDURE — 10002803 HB RX 637: Performed by: ANESTHESIOLOGY

## 2022-08-21 PROCEDURE — 10002801 HB RX 250 W/O HCPCS: Performed by: INTERNAL MEDICINE

## 2022-08-21 PROCEDURE — 13003289 HB OXYGEN THERAPY DAILY

## 2022-08-21 RX ADMIN — LIDOCAINE 1 PATCH: 4 PATCH TOPICAL at 09:09

## 2022-08-21 RX ADMIN — LEVOTHYROXINE SODIUM 75 MCG: 0.07 TABLET ORAL at 05:15

## 2022-08-21 RX ADMIN — DEXTROSE AND SODIUM CHLORIDE: 5; 450 INJECTION, SOLUTION INTRAVENOUS at 05:14

## 2022-08-21 RX ADMIN — LISINOPRIL 10 MG: 2.5 TABLET ORAL at 09:07

## 2022-08-21 RX ADMIN — LISINOPRIL 5 MG: 2.5 TABLET ORAL at 09:07

## 2022-08-21 ASSESSMENT — PAIN SCALES - PAIN ASSESSMENT IN ADVANCED DEMENTIA (PAINAD)
FACIALEXPRESSION: SMILING OR INEXPRESSIVE
TOTALSCORE: 2
BODYLANGUAGE: TENSE, DISTRESSED, FIDGETING
BREATHING: NORMAL
NEGVOCALIZATION: OCCASIONAL MOAN OR GROAN, LOW LEVELS OF SPEECH WITH A NEGATIVE OR DISAPPROVING QUALITY
CONSOLABILITY: NO NEED TO CONSOLE

## 2022-10-19 ENCOUNTER — APPOINTMENT (OUTPATIENT)
Dept: GENERAL RADIOLOGY | Facility: HOSPITAL | Age: 87
End: 2022-10-19
Attending: EMERGENCY MEDICINE
Payer: MEDICARE

## 2022-10-19 ENCOUNTER — APPOINTMENT (OUTPATIENT)
Dept: GENERAL RADIOLOGY | Facility: HOSPITAL | Age: 87
DRG: 683 | End: 2022-10-19
Attending: EMERGENCY MEDICINE
Payer: MEDICARE

## 2022-10-19 ENCOUNTER — HOSPITAL ENCOUNTER (INPATIENT)
Facility: HOSPITAL | Age: 87
LOS: 1 days | Discharge: NURSING, REHAB OR LONG-TERM CARE | DRG: 683 | End: 2022-10-21
Attending: EMERGENCY MEDICINE | Admitting: INTERNAL MEDICINE
Payer: MEDICARE

## 2022-10-19 ENCOUNTER — HOSPITAL ENCOUNTER (INPATIENT)
Facility: HOSPITAL | Age: 87
LOS: 1 days | Discharge: NURSING, REHAB OR LONG-TERM CARE | End: 2022-10-21
Attending: EMERGENCY MEDICINE | Admitting: INTERNAL MEDICINE
Payer: MEDICARE

## 2022-10-19 DIAGNOSIS — R09.02 HYPOXIA: Primary | ICD-10-CM

## 2022-10-19 DIAGNOSIS — N30.00 ACUTE CYSTITIS WITHOUT HEMATURIA: ICD-10-CM

## 2022-10-19 DIAGNOSIS — E87.5 HYPERKALEMIA: ICD-10-CM

## 2022-10-19 DIAGNOSIS — N17.9 AKI (ACUTE KIDNEY INJURY) (HCC): ICD-10-CM

## 2022-10-19 PROBLEM — R79.89 AZOTEMIA: Status: ACTIVE | Noted: 2022-10-19

## 2022-10-19 LAB
ALBUMIN SERPL-MCNC: 3.6 G/DL (ref 3.4–5)
ALBUMIN/GLOB SERPL: 0.8 {RATIO} (ref 1–2)
ALP LIVER SERPL-CCNC: 131 U/L
ALT SERPL-CCNC: 32 U/L
ANION GAP SERPL CALC-SCNC: 9 MMOL/L (ref 0–18)
APTT PPP: 26.3 SECONDS (ref 23.3–35.6)
AST SERPL-CCNC: 28 U/L (ref 15–37)
BASE EXCESS BLD CALC-SCNC: -8.8 MMOL/L (ref ?–2)
BASOPHILS # BLD AUTO: 0.13 X10(3) UL (ref 0–0.2)
BASOPHILS NFR BLD AUTO: 1.3 %
BILIRUB SERPL-MCNC: 0.2 MG/DL (ref 0.1–2)
BILIRUB UR QL: NEGATIVE
BUN BLD-MCNC: 46 MG/DL (ref 7–18)
BUN/CREAT SERPL: 25.8 (ref 10–20)
CA-I BLD-SCNC: 1.24 MMOL/L (ref 0.95–1.32)
CALCIUM BLD-MCNC: 8.9 MG/DL (ref 8.5–10.1)
CHLORIDE SERPL-SCNC: 115 MMOL/L (ref 98–112)
CO2 SERPL-SCNC: 18 MMOL/L (ref 21–32)
COHGB MFR BLD: 1.6 % (ref 0–3)
COLOR UR: YELLOW
CREAT BLD-MCNC: 1.78 MG/DL
DEPRECATED RDW RBC AUTO: 55.2 FL (ref 35.1–46.3)
EOSINOPHIL # BLD AUTO: 0.35 X10(3) UL (ref 0–0.7)
EOSINOPHIL NFR BLD AUTO: 3.5 %
ERYTHROCYTE [DISTWIDTH] IN BLOOD BY AUTOMATED COUNT: 14.5 % (ref 11–15)
GFR SERPLBLD BASED ON 1.73 SQ M-ARVRAT: 26 ML/MIN/1.73M2 (ref 60–?)
GLOBULIN PLAS-MCNC: 4.5 G/DL (ref 2.8–4.4)
GLUCOSE BLD-MCNC: 98 MG/DL (ref 70–99)
GLUCOSE BLDC GLUCOMTR-MCNC: 155 MG/DL (ref 70–99)
GLUCOSE UR-MCNC: 50 MG/DL
HCO3 BLDA-SCNC: 17.8 MEQ/L (ref 21–27)
HCT VFR BLD AUTO: 53.8 %
HGB BLD-MCNC: 16.3 G/DL
HGB BLD-MCNC: 16.6 G/DL
HGB UR QL STRIP.AUTO: NEGATIVE
IMM GRANULOCYTES # BLD AUTO: 0.25 X10(3) UL (ref 0–1)
IMM GRANULOCYTES NFR BLD: 2.5 %
INR BLD: 0.98 (ref 0.85–1.16)
KETONES UR-MCNC: NEGATIVE MG/DL
LACTATE BLD-SCNC: 0.9 MMOL/L (ref 0.5–2)
LACTATE SERPL-SCNC: 1.5 MMOL/L (ref 0.4–2)
LYMPHOCYTES # BLD AUTO: 2.15 X10(3) UL (ref 1–4)
LYMPHOCYTES NFR BLD AUTO: 21.4 %
MCH RBC QN AUTO: 31.4 PG (ref 26–34)
MCHC RBC AUTO-ENTMCNC: 30.9 G/DL (ref 31–37)
MCV RBC AUTO: 101.9 FL
METHGB MFR BLD: 1.6 % SAT (ref 0.4–1.5)
MODIFIED ALLEN TEST: POSITIVE
MONOCYTES # BLD AUTO: 1.01 X10(3) UL (ref 0.1–1)
MONOCYTES NFR BLD AUTO: 10 %
NEUTROPHILS # BLD AUTO: 6.18 X10 (3) UL (ref 1.5–7.7)
NEUTROPHILS # BLD AUTO: 6.18 X10(3) UL (ref 1.5–7.7)
NEUTROPHILS NFR BLD AUTO: 61.3 %
NITRITE UR QL STRIP.AUTO: NEGATIVE
NT-PROBNP SERPL-MCNC: 474 PG/ML (ref ?–450)
O2 CT BLD-SCNC: 20.4 VOL% (ref 15–23)
OSMOLALITY SERPL CALC.SUM OF ELEC: 306 MOSM/KG (ref 275–295)
OXYGEN LITERS/MINUTE: 6 L/MIN
PCO2 BLDA: 26 MM HG (ref 35–45)
PH BLDA: 7.36 [PH] (ref 7.35–7.45)
PH UR: 5 [PH] (ref 5–8)
PLATELET # BLD AUTO: 291 10(3)UL (ref 150–450)
PO2 BLDA: 63 MM HG (ref 80–100)
POTASSIUM BLD-SCNC: 6.5 MMOL/L (ref 3.6–5.1)
POTASSIUM SERPL-SCNC: 6.5 MMOL/L (ref 3.5–5.1)
PROCALCITONIN SERPL-MCNC: 0.05 NG/ML (ref ?–0.16)
PROT SERPL-MCNC: 8.1 G/DL (ref 6.4–8.2)
PROT UR-MCNC: 30 MG/DL
PROTHROMBIN TIME: 12.9 SECONDS (ref 11.6–14.8)
PUNCTURE CHARGE: YES
RBC # BLD AUTO: 5.28 X10(6)UL
SAO2 % BLDA: 91.9 % (ref 94–100)
SARS-COV-2 RNA RESP QL NAA+PROBE: NOT DETECTED
SODIUM BLD-SCNC: 138 MMOL/L (ref 135–145)
SODIUM SERPL-SCNC: 142 MMOL/L (ref 136–145)
SODIUM SERPL-SCNC: 81 MMOL/L
SP GR UR STRIP: 1.02 (ref 1–1.03)
UROBILINOGEN UR STRIP-ACNC: <2
VIT C UR-MCNC: 40 MG/DL
WBC # BLD AUTO: 10.1 X10(3) UL (ref 4–11)

## 2022-10-19 PROCEDURE — 71045 X-RAY EXAM CHEST 1 VIEW: CPT | Performed by: EMERGENCY MEDICINE

## 2022-10-19 RX ORDER — DEXTROSE MONOHYDRATE 25 G/50ML
50 INJECTION, SOLUTION INTRAVENOUS ONCE
Status: COMPLETED | OUTPATIENT
Start: 2022-10-19 | End: 2022-10-19

## 2022-10-19 RX ORDER — CALCIUM GLUCONATE 10 MG/ML
1 INJECTION, SOLUTION INTRAVENOUS ONCE
Status: COMPLETED | OUTPATIENT
Start: 2022-10-19 | End: 2022-10-19

## 2022-10-19 NOTE — ED INITIAL ASSESSMENT (HPI)
Pt to ED from [place] [id number] via [facility] with c/o hypoxia and lethargy. Spo2- 90% at room air. Pt oriented x4. Pt speaking in short sentences. Pt appears fatigued. DNR status, form sent over with EMS.

## 2022-10-20 ENCOUNTER — APPOINTMENT (OUTPATIENT)
Dept: NUCLEAR MEDICINE | Facility: HOSPITAL | Age: 87
DRG: 683 | End: 2022-10-20
Attending: EMERGENCY MEDICINE
Payer: MEDICARE

## 2022-10-20 ENCOUNTER — APPOINTMENT (OUTPATIENT)
Dept: NUCLEAR MEDICINE | Facility: HOSPITAL | Age: 87
End: 2022-10-20
Attending: EMERGENCY MEDICINE
Payer: MEDICARE

## 2022-10-20 ENCOUNTER — APPOINTMENT (OUTPATIENT)
Dept: ULTRASOUND IMAGING | Facility: HOSPITAL | Age: 87
End: 2022-10-20
Attending: INTERNAL MEDICINE
Payer: MEDICARE

## 2022-10-20 ENCOUNTER — APPOINTMENT (OUTPATIENT)
Dept: ULTRASOUND IMAGING | Facility: HOSPITAL | Age: 87
DRG: 683 | End: 2022-10-20
Attending: INTERNAL MEDICINE
Payer: MEDICARE

## 2022-10-20 PROBLEM — E87.5 HYPERKALEMIA: Status: ACTIVE | Noted: 2022-10-20

## 2022-10-20 PROBLEM — N17.9 AKI (ACUTE KIDNEY INJURY) (HCC): Status: ACTIVE | Noted: 2022-10-20

## 2022-10-20 LAB
ALBUMIN SERPL-MCNC: 3 G/DL (ref 3.4–5)
ANION GAP SERPL CALC-SCNC: 9 MMOL/L (ref 0–18)
BASOPHILS # BLD AUTO: 0.14 X10(3) UL (ref 0–0.2)
BASOPHILS NFR BLD AUTO: 1.4 %
BUN BLD-MCNC: 38 MG/DL (ref 7–18)
BUN/CREAT SERPL: 27 (ref 10–20)
CALCIUM BLD-MCNC: 8.9 MG/DL (ref 8.5–10.1)
CHLORIDE SERPL-SCNC: 116 MMOL/L (ref 98–112)
CO2 SERPL-SCNC: 17 MMOL/L (ref 21–32)
CREAT BLD-MCNC: 1.41 MG/DL
DEPRECATED RDW RBC AUTO: 52.9 FL (ref 35.1–46.3)
EOSINOPHIL # BLD AUTO: 0.31 X10(3) UL (ref 0–0.7)
EOSINOPHIL NFR BLD AUTO: 3.1 %
ERYTHROCYTE [DISTWIDTH] IN BLOOD BY AUTOMATED COUNT: 13.9 % (ref 11–15)
GFR SERPLBLD BASED ON 1.73 SQ M-ARVRAT: 34 ML/MIN/1.73M2 (ref 60–?)
GLUCOSE BLD-MCNC: 101 MG/DL (ref 70–99)
HCT VFR BLD AUTO: 48.5 %
HGB BLD-MCNC: 15.1 G/DL
IMM GRANULOCYTES # BLD AUTO: 0.22 X10(3) UL (ref 0–1)
IMM GRANULOCYTES NFR BLD: 2.2 %
LYMPHOCYTES # BLD AUTO: 1.37 X10(3) UL (ref 1–4)
LYMPHOCYTES NFR BLD AUTO: 13.7 %
MCH RBC QN AUTO: 31.9 PG (ref 26–34)
MCHC RBC AUTO-ENTMCNC: 31.1 G/DL (ref 31–37)
MCV RBC AUTO: 102.3 FL
MONOCYTES # BLD AUTO: 1 X10(3) UL (ref 0.1–1)
MONOCYTES NFR BLD AUTO: 10 %
NEUTROPHILS # BLD AUTO: 6.94 X10 (3) UL (ref 1.5–7.7)
NEUTROPHILS # BLD AUTO: 6.94 X10(3) UL (ref 1.5–7.7)
NEUTROPHILS NFR BLD AUTO: 69.6 %
OSMOLALITY SERPL CALC.SUM OF ELEC: 303 MOSM/KG (ref 275–295)
PHOSPHATE SERPL-MCNC: 4 MG/DL (ref 2.5–4.9)
PLATELET # BLD AUTO: 225 10(3)UL (ref 150–450)
POTASSIUM SERPL-SCNC: 5.4 MMOL/L (ref 3.5–5.1)
RBC # BLD AUTO: 4.74 X10(6)UL
SODIUM SERPL-SCNC: 142 MMOL/L (ref 136–145)
WBC # BLD AUTO: 10 X10(3) UL (ref 4–11)

## 2022-10-20 PROCEDURE — 78582 LUNG VENTILAT&PERFUS IMAGING: CPT | Performed by: EMERGENCY MEDICINE

## 2022-10-20 PROCEDURE — 99223 1ST HOSP IP/OBS HIGH 75: CPT | Performed by: INTERNAL MEDICINE

## 2022-10-20 PROCEDURE — 76770 US EXAM ABDO BACK WALL COMP: CPT | Performed by: INTERNAL MEDICINE

## 2022-10-20 RX ORDER — LIDOCAINE 50 MG/G
1 PATCH TOPICAL EVERY 24 HOURS
COMMUNITY

## 2022-10-20 RX ORDER — FAMOTIDINE 20 MG/1
20 TABLET, FILM COATED ORAL DAILY
COMMUNITY
Start: 2022-10-08

## 2022-10-20 RX ORDER — LEVOTHYROXINE SODIUM 0.07 MG/1
75 TABLET ORAL
COMMUNITY

## 2022-10-20 RX ORDER — LISINOPRIL 10 MG/1
15 TABLET ORAL DAILY
COMMUNITY

## 2022-10-20 RX ORDER — ONDANSETRON 8 MG/1
8 TABLET, ORALLY DISINTEGRATING ORAL EVERY 8 HOURS PRN
COMMUNITY

## 2022-10-20 RX ORDER — HEPARIN SODIUM 5000 [USP'U]/ML
5000 INJECTION, SOLUTION INTRAVENOUS; SUBCUTANEOUS EVERY 8 HOURS SCHEDULED
Status: DISCONTINUED | OUTPATIENT
Start: 2022-10-20 | End: 2022-10-21

## 2022-10-20 RX ORDER — ACETAMINOPHEN 500 MG
500 TABLET ORAL EVERY 6 HOURS PRN
COMMUNITY

## 2022-10-20 RX ORDER — SODIUM CHLORIDE 9 MG/ML
INJECTION, SOLUTION INTRAVENOUS CONTINUOUS
Status: DISCONTINUED | OUTPATIENT
Start: 2022-10-20 | End: 2022-10-21

## 2022-10-20 RX ORDER — CODEINE PHOSPHATE AND GUAIFENESIN 10; 100 MG/5ML; MG/5ML
5 SOLUTION ORAL EVERY 6 HOURS PRN
COMMUNITY

## 2022-10-20 RX ORDER — POTASSIUM CHLORIDE 20 MEQ/1
40 TABLET, EXTENDED RELEASE ORAL 2 TIMES DAILY
COMMUNITY

## 2022-10-20 RX ORDER — MULTIVIT-MIN/IRON FUM/FOLIC AC 7.5 MG-4
1 TABLET ORAL DAILY
COMMUNITY

## 2022-10-20 RX ORDER — LOPERAMIDE HYDROCHLORIDE 2 MG/1
4 CAPSULE ORAL 4 TIMES DAILY PRN
COMMUNITY

## 2022-10-20 NOTE — ED QUICK NOTES
Dr. Griselda Patterson reports to page Nuclear Medicine and see if there is a dose ready for VQ scan if there is no dose ready then pt can wait but must receive scan first thing in the morning.

## 2022-10-20 NOTE — ED QUICK NOTES
Orders for admission, patient is aware of plan and ready to go upstairs. Any questions, please call ED RN Denisa PEREZ at extension [phone].      Patient Covid vaccination status: Fully vaccinated     COVID Test Ordered in ED: Rapid SARS-CoV-2 by PCR    COVID Suspicion at Admission: Low clinical suspicion for COVID    Running Infusions:      Mental Status/LOC at time of transport: A&Ox1    Other pertinent information:   CIWA score: N/A   NIH score:  N/A

## 2022-10-20 NOTE — ED QUICK NOTES
Spoke with nuclear  medicine and Marquis Greco reports that there is not a dose ready so pt will receive scan first thing in the morning.

## 2022-10-20 NOTE — PLAN OF CARE
Patient oriented x1. Drowsy. On 2L o2. SLP eval, modified consistency diet per rec- likely d/t patient AMS. Valtessa for hyperkalemia. Loose BM, cdiff sample to be collected and sent for testing. IVF started. IV abx as ordered. Purewick in place. No complaints of pain. Bed locked and in lowest position. Call light within reach. Family Radha Matos and eddy Yin Screen at bedside, updated and agreeable to POC.    Problem: Patient Centered Care  Goal: Patient preferences are identified and integrated in the patient's plan of care  Description: Interventions:  - What would you like us to know as we care for you?   - Provide timely, complete, and accurate information to patient/family  - Incorporate patient and family knowledge, values, beliefs, and cultural backgrounds into the planning and delivery of care  - Encourage patient/family to participate in care and decision-making at the level they choose  - Honor patient and family perspectives and choices  Outcome: Progressing     Problem: Patient/Family Goals  Goal: Patient/Family Long Term Goal  Description: Patient's Long Term Goal:    - See additional Care Plan goals for specific interventions  Outcome: Progressing  Goal: Patient/Family Short Term Goal  Description: Patient's Short Term Goal:   - See additional Care Plan goals for specific interventions  Outcome: Progressing     Problem: PAIN - ADULT  Goal: Verbalizes/displays adequate comfort level or patient's stated pain goal  Description: INTERVENTIONS:  - Encourage pt to monitor pain and request assistance  - Assess pain using appropriate pain scale  - Administer analgesics based on type and severity of pain and evaluate response  - Implement non-pharmacological measures as appropriate and evaluate response  - Consider cultural and social influences on pain and pain management  - Manage/alleviate anxiety  - Utilize distraction and/or relaxation techniques  - Monitor for opioid side effects  - Notify MD/LIP if interventions unsuccessful or patient reports new pain  - Anticipate increased pain with activity and pre-medicate as appropriate  Outcome: Progressing     Problem: SAFETY ADULT - FALL  Goal: Free from fall injury  Description: INTERVENTIONS:  - Assess pt frequently for physical needs  - Identify cognitive and physical deficits and behaviors that affect risk of falls.   - [place] fall precautions as indicated by assessment.  - Educate pt/family on patient safety including physical limitations  - Instruct pt to call for assistance with activity based on assessment  - Modify environment to reduce risk of injury  - Provide assistive devices as appropriate  - Consider OT/PT consult to assist with strengthening/mobility  - Encourage toileting schedule  Outcome: Progressing

## 2022-10-20 NOTE — CM/SW NOTE
Per chart review, pt presents from [facility]. SW requested for return ref to [facility]. PLAN: pending medical course - return to [facility] 144 remains available for support and/or discharge planning. Please do not hesitate to call/chat SW if further DC needs arise.      Tiburcio ESQUEDA, [facility], [place]   Ext [phone]

## 2022-10-20 NOTE — CM/SW NOTE
[facility] referral started to [facility] extended [facility] and is pending.

## 2022-10-20 NOTE — PLAN OF CARE
Pt admitted to floor for hypoxia and bibasilar atelectasis. 4L O2. Weak non productive cough. A/Ox1. Denies pain and nausea. Resting in bed. Prompt incontinence care provided. Purewick in place. Plan for VQ scan in am. Call light within reach. Safety precautions in place.       Problem: Patient Centered Care  Goal: Patient preferences are identified and integrated in the patient's plan of care  Description: Interventions:  - What would you like us to know as we care for you?   - Provide timely, complete, and accurate information to patient/family  - Incorporate patient and family knowledge, values, beliefs, and cultural backgrounds into the planning and delivery of care  - Encourage patient/family to participate in care and decision-making at the level they choose  - Honor patient and family perspectives and choices  Outcome: Progressing     Problem: Patient/Family Goals  Goal: Patient/Family Long Term Goal  Description: Patient's Long Term Goal:    - See additional Care Plan goals for specific interventions  Outcome: Progressing  Goal: Patient/Family Short Term Goal  Description: Patient's Short Term Goal:   - See additional Care Plan goals for specific interventions  Outcome: Progressing     Problem: PAIN - ADULT  Goal: Verbalizes/displays adequate comfort level or patient's stated pain goal  Description: INTERVENTIONS:  - Encourage pt to monitor pain and request assistance  - Assess pain using appropriate pain scale  - Administer analgesics based on type and severity of pain and evaluate response  - Implement non-pharmacological measures as appropriate and evaluate response  - Consider cultural and social influences on pain and pain management  - Manage/alleviate anxiety  - Utilize distraction and/or relaxation techniques  - Monitor for opioid side effects  - Notify MD/LIP if interventions unsuccessful or patient reports new pain  - Anticipate increased pain with activity and pre-medicate as appropriate  Outcome: Progressing     Problem: SAFETY ADULT - FALL  Goal: Free from fall injury  Description: INTERVENTIONS:  - Assess pt frequently for physical needs  - Identify cognitive and physical deficits and behaviors that affect risk of falls.   - [place] fall precautions as indicated by assessment.  - Educate pt/family on patient safety including physical limitations  - Instruct pt to call for assistance with activity based on assessment  - Modify environment to reduce risk of injury  - Provide assistive devices as appropriate  - Consider OT/PT consult to assist with strengthening/mobility  - Encourage toileting schedule  Outcome: Progressing

## 2022-10-20 NOTE — PROGRESS NOTES
[facility] Pharmacy Note:  Renal Adjustment for piperacillin/tazobactam ([place])    Elvin Ozuna is a 80year old patient who has been prescribed piperacillin/tazobactam (ZOSYN) 3.375 gm every 8 hrs. The estimated creatinine clearance is 16.3 mL/min (A) (based on SCr of 1.78 mg/dL (H)). The dose has been adjusted to piperacillin/tazobactam (ZOSYN) 3.375 gm every 12 hrs per hospital renal dose adjustment protocol for treatment of UTI. Pharmacy will follow and adjust dose as warranted for additional renal function changes.     Thank you,    Paulette De Jesus, PharmD  10/20/2022  5:20 AM

## 2022-10-21 ENCOUNTER — APPOINTMENT (OUTPATIENT)
Dept: GENERAL RADIOLOGY | Facility: HOSPITAL | Age: 87
DRG: 683 | End: 2022-10-21
Attending: INTERNAL MEDICINE
Payer: MEDICARE

## 2022-10-21 ENCOUNTER — APPOINTMENT (OUTPATIENT)
Dept: GENERAL RADIOLOGY | Facility: HOSPITAL | Age: 87
End: 2022-10-21
Attending: INTERNAL MEDICINE
Payer: MEDICARE

## 2022-10-21 VITALS
BODY MASS INDEX: 27.49 KG/M2 | DIASTOLIC BLOOD PRESSURE: 76 MMHG | TEMPERATURE: 98 F | HEART RATE: 88 BPM | RESPIRATION RATE: 18 BRPM | OXYGEN SATURATION: 95 % | WEIGHT: 165 LBS | HEIGHT: 65 IN | SYSTOLIC BLOOD PRESSURE: 145 MMHG

## 2022-10-21 LAB
ALBUMIN SERPL-MCNC: 2.9 G/DL (ref 3.4–5)
ANION GAP SERPL CALC-SCNC: 9 MMOL/L (ref 0–18)
BASOPHILS # BLD AUTO: 0.09 X10(3) UL (ref 0–0.2)
BASOPHILS NFR BLD AUTO: 1 %
BUN BLD-MCNC: 28 MG/DL (ref 7–18)
BUN/CREAT SERPL: 22.6 (ref 10–20)
CALCIUM BLD-MCNC: 8.7 MG/DL (ref 8.5–10.1)
CHLORIDE SERPL-SCNC: 115 MMOL/L (ref 98–112)
CO2 SERPL-SCNC: 16 MMOL/L (ref 21–32)
CREAT BLD-MCNC: 1.24 MG/DL
DEPRECATED RDW RBC AUTO: 53.1 FL (ref 35.1–46.3)
EOSINOPHIL # BLD AUTO: 0.26 X10(3) UL (ref 0–0.7)
EOSINOPHIL NFR BLD AUTO: 3 %
ERYTHROCYTE [DISTWIDTH] IN BLOOD BY AUTOMATED COUNT: 14 % (ref 11–15)
GFR SERPLBLD BASED ON 1.73 SQ M-ARVRAT: 40 ML/MIN/1.73M2 (ref 60–?)
GLUCOSE BLD-MCNC: 92 MG/DL (ref 70–99)
HCT VFR BLD AUTO: 47 %
HGB BLD-MCNC: 14.5 G/DL
IMM GRANULOCYTES # BLD AUTO: 0.15 X10(3) UL (ref 0–1)
IMM GRANULOCYTES NFR BLD: 1.7 %
LYMPHOCYTES # BLD AUTO: 1.87 X10(3) UL (ref 1–4)
LYMPHOCYTES NFR BLD AUTO: 21.3 %
MCH RBC QN AUTO: 31.3 PG (ref 26–34)
MCHC RBC AUTO-ENTMCNC: 30.9 G/DL (ref 31–37)
MCV RBC AUTO: 101.3 FL
MONOCYTES # BLD AUTO: 0.79 X10(3) UL (ref 0.1–1)
MONOCYTES NFR BLD AUTO: 9 %
NEUTROPHILS # BLD AUTO: 5.61 X10 (3) UL (ref 1.5–7.7)
NEUTROPHILS # BLD AUTO: 5.61 X10(3) UL (ref 1.5–7.7)
NEUTROPHILS NFR BLD AUTO: 64 %
OSMOLALITY SERPL CALC.SUM OF ELEC: 295 MOSM/KG (ref 275–295)
PHOSPHATE SERPL-MCNC: 4.1 MG/DL (ref 2.5–4.9)
PLATELET # BLD AUTO: 274 10(3)UL (ref 150–450)
POTASSIUM SERPL-SCNC: 4.7 MMOL/L (ref 3.5–5.1)
RBC # BLD AUTO: 4.64 X10(6)UL
SODIUM SERPL-SCNC: 140 MMOL/L (ref 136–145)
WBC # BLD AUTO: 8.8 X10(3) UL (ref 4–11)

## 2022-10-21 PROCEDURE — 99233 SBSQ HOSP IP/OBS HIGH 50: CPT | Performed by: INTERNAL MEDICINE

## 2022-10-21 PROCEDURE — 71045 X-RAY EXAM CHEST 1 VIEW: CPT | Performed by: INTERNAL MEDICINE

## 2022-10-21 RX ORDER — LEVOFLOXACIN 500 MG/1
500 TABLET, FILM COATED ORAL DAILY
Qty: 7 TABLET | Refills: 0 | Status: SHIPPED | OUTPATIENT
Start: 2022-10-21 | End: 2022-10-26

## 2022-10-21 NOTE — DISCHARGE SUMMARY
[facility]  Discharge Summary    Kailee Crabtree Patient Status:  Inpatient    10/23/1924 MRN [id number]   Location [facility] 4W/SW/SE Attending Karolee Goodell, MD   Hosp Day # 1 PCP Tammy Oglesby MD     Date of Admission: 10/19/2022    Date of Discharge: 10/21/22      Admitting Diagnosis: Hyperkalemia [E87.5]  Hypoxia [R09.02]  DOMINGUEZ (acute kidney injury) ([address] [N17.9]    Discharge Diagnosis: Patient Active Problem List:     Azotemia     Hypoxia     Hyperkalemia     DOMINGUEZ (acute kidney injury) ([address]      Reason for Admission: ***    Physical Exam: {exam; complete normal and system select:75649}    History of Present Illness: ***    Hospital Course: ***    Consultations: ***    Procedures: ***    Complications: ***    Disposition: Final discharge disposition not confirmed    Discharge Condition: {DISCHARGE CONDITION:}    Discharge Medications: Current Discharge Medication List    START taking these medications    levoFLOXacin (LEVAQUIN) 500 MG Oral Tab  Take 1 tablet (500 mg total) by mouth daily. Qty: 7 tablet Refills: 0      CONTINUE these medications which have NOT CHANGED    levothyroxine 75 MCG Oral Tab  Take 75 mcg by mouth before breakfast.    lisinopril 10 MG Oral Tab  Take 15 mg by mouth daily. loperamide 2 MG Oral Cap  Take 4 mg by mouth 4 (four) times daily as needed for Diarrhea. Multiple Vitamins-Minerals (MULTI-VITAMIN/MINERALS) Oral Tab  Take 1 tablet by mouth daily. potassium chloride 20 MEQ Oral Tab CR  Take 40 mEq by mouth 2 (two) times daily. Give 2 tablets by mouth 2 times a day    famotidine 20 MG Oral Tab  Take 20 mg by mouth daily. acetaminophen 500 MG Oral Tab  Take 500 mg by mouth every 6 (six) hours as needed for Pain.    guaiFENesin-codeine 100-10 MG/5ML Oral Solution  Take 5 mL by mouth every 6 (six) hours as needed for cough. lidocaine 5 % External Patch  Place 1 patch onto the skin daily.  L rib cage    ondansetron 8 MG Oral Tablet Dispersible  Take 8 mg by mouth every 8 (eight) hours as needed for Nausea. Follow up Visits:  Follow-up with *** in {0-10:50980} {units:19995}    Follow up Labs: *** in {0-10:01193} {units:19995}    Other Discharge Instructions: ***    Yanique Engle MD  10/21/2022  11:09 AM

## 2022-10-21 NOTE — CODE DOCUMENTATION
Discharge confirmed for today. Notified [facility], updates sent. Arranged [facility] ([phone]) for 1pm, PCS complete. Confirmed plan w/ Blanca Orta RN & son Salty Rodriguez RN to report 911 [address]

## 2022-10-21 NOTE — PLAN OF CARE
No acute changes overnight. Patient remains alert and oriented x1. No complaints of pain. Purewick in place, voiding freely. IVF running and IV abx given. On 2L O2. Tolerating diet, without nausea/vomiting. Plan to return to [facility] when medically stable.      Problem: Patient Centered Care  Goal: Patient preferences are identified and integrated in the patient's plan of care  Description: Interventions:  - What would you like us to know as we care for you?   - Provide timely, complete, and accurate information to patient/family  - Incorporate patient and family knowledge, values, beliefs, and cultural backgrounds into the planning and delivery of care  - Encourage patient/family to participate in care and decision-making at the level they choose  - Honor patient and family perspectives and choices  Outcome: Progressing     Problem: Patient/Family Goals  Goal: Patient/Family Long Term Goal  Description: Patient's Long Term Goal: to go back to [facility]     -Medications as ordered   -Labs/tests as ordered  - See additional Care Plan goals for specific interventions  Outcome: Progressing  Goal: Patient/Family Short Term Goal  Description: Patient's Short Term Goal:     - See additional Care Plan goals for specific interventions  Outcome: Progressing     Problem: PAIN - ADULT  Goal: Verbalizes/displays adequate comfort level or patient's stated pain goal  Description: INTERVENTIONS:  - Encourage pt to monitor pain and request assistance  - Assess pain using appropriate pain scale  - Administer analgesics based on type and severity of pain and evaluate response  - Implement non-pharmacological measures as appropriate and evaluate response  - Consider cultural and social influences on pain and pain management  - Manage/alleviate anxiety  - Utilize distraction and/or relaxation techniques  - Monitor for opioid side effects  - Notify MD/LIP if interventions unsuccessful or patient reports new pain  - Anticipate increased pain with activity and pre-medicate as appropriate  Outcome: Progressing     Problem: SAFETY ADULT - FALL  Goal: Free from fall injury  Description: INTERVENTIONS:  - Assess pt frequently for physical needs  - Identify cognitive and physical deficits and behaviors that affect risk of falls.   - [place] fall precautions as indicated by assessment.  - Educate pt/family on patient safety including physical limitations  - Instruct pt to call for assistance with activity based on assessment  - Modify environment to reduce risk of injury  - Provide assistive devices as appropriate  - Consider OT/PT consult to assist with strengthening/mobility  - Encourage toileting schedule  Outcome: Progressing

## 2022-10-21 NOTE — PLAN OF CARE
Problem: Patient Centered Care  Goal: Patient preferences are identified and integrated in the patient's plan of care  Description: Interventions:  - What would you like us to know as we care for you?   - Provide timely, complete, and accurate information to patient/family  - Incorporate patient and family knowledge, values, beliefs, and cultural backgrounds into the planning and delivery of care  - Encourage patient/family to participate in care and decision-making at the level they choose  - Honor patient and family perspectives and choices  Outcome: Adequate for Discharge     Problem: Patient/Family Goals  Goal: Patient/Family Long Term Goal  Description: Patient's Long Term Goal: to go back to [facility]     -Medications as ordered   -Labs/tests as ordered  - See additional Care Plan goals for specific interventions  Outcome: Adequate for Discharge  Goal: Patient/Family Short Term Goal  Description: Patient's Short Term Goal:     - See additional Care Plan goals for specific interventions  Outcome: Adequate for Discharge     Problem: PAIN - ADULT  Goal: Verbalizes/displays adequate comfort level or patient's stated pain goal  Description: INTERVENTIONS:  - Encourage pt to monitor pain and request assistance  - Assess pain using appropriate pain scale  - Administer analgesics based on type and severity of pain and evaluate response  - Implement non-pharmacological measures as appropriate and evaluate response  - Consider cultural and social influences on pain and pain management  - Manage/alleviate anxiety  - Utilize distraction and/or relaxation techniques  - Monitor for opioid side effects  - Notify MD/LIP if interventions unsuccessful or patient reports new pain  - Anticipate increased pain with activity and pre-medicate as appropriate  Outcome: Adequate for Discharge     Problem: SAFETY ADULT - FALL  Goal: Free from fall injury  Description: INTERVENTIONS:  - Assess pt frequently for physical needs  - Identify cognitive and physical deficits and behaviors that affect risk of falls.   - [place] fall precautions as indicated by assessment.  - Educate pt/family on patient safety including physical limitations  - Instruct pt to call for assistance with activity based on assessment  - Modify environment to reduce risk of injury  - Provide assistive devices as appropriate  - Consider OT/PT consult to assist with strengthening/mobility  - Encourage toileting schedule  Outcome: Adequate for Discharge

## 2022-10-23 ENCOUNTER — APPOINTMENT (OUTPATIENT)
Dept: CT IMAGING | Facility: HOSPITAL | Age: 87
End: 2022-10-23
Attending: STUDENT IN AN ORGANIZED HEALTH CARE EDUCATION/TRAINING PROGRAM
Payer: MEDICARE

## 2022-10-23 ENCOUNTER — APPOINTMENT (OUTPATIENT)
Dept: GENERAL RADIOLOGY | Facility: HOSPITAL | Age: 87
End: 2022-10-23
Attending: STUDENT IN AN ORGANIZED HEALTH CARE EDUCATION/TRAINING PROGRAM
Payer: MEDICARE

## 2022-10-23 ENCOUNTER — HOSPITAL ENCOUNTER (INPATIENT)
Facility: HOSPITAL | Age: 87
LOS: 3 days | Discharge: HOSPICE | End: 2022-10-26
Attending: STUDENT IN AN ORGANIZED HEALTH CARE EDUCATION/TRAINING PROGRAM | Admitting: INTERNAL MEDICINE
Payer: MEDICARE

## 2022-10-23 ENCOUNTER — HOSPITAL ENCOUNTER (INPATIENT)
Facility: HOSPITAL | Age: 87
LOS: 3 days | Discharge: HOSPICE | End: 2022-10-26
Attending: STUDENT IN AN ORGANIZED HEALTH CARE EDUCATION/TRAINING PROGRAM | Admitting: INTERNAL MEDICINE
Payer: MEDICARE

## 2022-10-23 DIAGNOSIS — I21.4 NSTEMI (NON-ST ELEVATED MYOCARDIAL INFARCTION) (HCC): ICD-10-CM

## 2022-10-23 DIAGNOSIS — J96.01 ACUTE RESPIRATORY FAILURE WITH HYPOXIA (HCC): ICD-10-CM

## 2022-10-23 DIAGNOSIS — I63.9 ACUTE CVA (CEREBROVASCULAR ACCIDENT) (HCC): Primary | ICD-10-CM

## 2022-10-23 LAB
ANION GAP SERPL CALC-SCNC: 9 MMOL/L (ref 0–18)
BASOPHILS # BLD AUTO: 0.12 X10(3) UL (ref 0–0.2)
BASOPHILS NFR BLD AUTO: 1.1 %
BUN BLD-MCNC: 25 MG/DL (ref 7–18)
BUN/CREAT SERPL: 16.9 (ref 10–20)
CALCIUM BLD-MCNC: 9.4 MG/DL (ref 8.5–10.1)
CHLORIDE SERPL-SCNC: 113 MMOL/L (ref 98–112)
CHOLEST SERPL-MCNC: 222 MG/DL (ref ?–200)
CO2 SERPL-SCNC: 17 MMOL/L (ref 21–32)
CREAT BLD-MCNC: 1.48 MG/DL
DEPRECATED RDW RBC AUTO: 51.8 FL (ref 35.1–46.3)
EOSINOPHIL # BLD AUTO: 0.27 X10(3) UL (ref 0–0.7)
EOSINOPHIL NFR BLD AUTO: 2.4 %
ERYTHROCYTE [DISTWIDTH] IN BLOOD BY AUTOMATED COUNT: 14.1 % (ref 11–15)
EST. AVERAGE GLUCOSE BLD GHB EST-MCNC: 140 MG/DL (ref 68–126)
GFR SERPLBLD BASED ON 1.73 SQ M-ARVRAT: 32 ML/MIN/1.73M2 (ref 60–?)
GLUCOSE BLD-MCNC: 102 MG/DL (ref 70–99)
GLUCOSE BLDC GLUCOMTR-MCNC: 108 MG/DL (ref 70–99)
GLUCOSE BLDC GLUCOMTR-MCNC: 110 MG/DL (ref 70–99)
HBA1C MFR BLD: 6.5 % (ref ?–5.7)
HCT VFR BLD AUTO: 47.5 %
HDLC SERPL-MCNC: 46 MG/DL (ref 40–59)
HGB BLD-MCNC: 15.2 G/DL
IMM GRANULOCYTES # BLD AUTO: 0.45 X10(3) UL (ref 0–1)
IMM GRANULOCYTES NFR BLD: 4 %
LDLC SERPL CALC-MCNC: 138 MG/DL (ref ?–100)
LYMPHOCYTES # BLD AUTO: 2.94 X10(3) UL (ref 1–4)
LYMPHOCYTES NFR BLD AUTO: 26.1 %
MCH RBC QN AUTO: 31.9 PG (ref 26–34)
MCHC RBC AUTO-ENTMCNC: 32 G/DL (ref 31–37)
MCV RBC AUTO: 99.8 FL
MONOCYTES # BLD AUTO: 1.16 X10(3) UL (ref 0.1–1)
MONOCYTES NFR BLD AUTO: 10.3 %
NEUTROPHILS # BLD AUTO: 6.34 X10 (3) UL (ref 1.5–7.7)
NEUTROPHILS # BLD AUTO: 6.34 X10(3) UL (ref 1.5–7.7)
NEUTROPHILS NFR BLD AUTO: 56.1 %
NONHDLC SERPL-MCNC: 176 MG/DL (ref ?–130)
OSMOLALITY SERPL CALC.SUM OF ELEC: 293 MOSM/KG (ref 275–295)
PLATELET # BLD AUTO: 249 10(3)UL (ref 150–450)
POTASSIUM SERPL-SCNC: 4.9 MMOL/L (ref 3.5–5.1)
RBC # BLD AUTO: 4.76 X10(6)UL
SARS-COV-2 RNA RESP QL NAA+PROBE: DETECTED
SODIUM SERPL-SCNC: 139 MMOL/L (ref 136–145)
TRIGL SERPL-MCNC: 209 MG/DL (ref 30–149)
TROPONIN I HIGH SENSITIVITY: 234 NG/L
VLDLC SERPL CALC-MCNC: 39 MG/DL (ref 0–30)
WBC # BLD AUTO: 11.3 X10(3) UL (ref 4–11)

## 2022-10-23 PROCEDURE — 71045 X-RAY EXAM CHEST 1 VIEW: CPT | Performed by: STUDENT IN AN ORGANIZED HEALTH CARE EDUCATION/TRAINING PROGRAM

## 2022-10-23 PROCEDURE — 70498 CT ANGIOGRAPHY NECK: CPT | Performed by: STUDENT IN AN ORGANIZED HEALTH CARE EDUCATION/TRAINING PROGRAM

## 2022-10-23 PROCEDURE — 70496 CT ANGIOGRAPHY HEAD: CPT | Performed by: STUDENT IN AN ORGANIZED HEALTH CARE EDUCATION/TRAINING PROGRAM

## 2022-10-23 PROCEDURE — 70450 CT HEAD/BRAIN W/O DYE: CPT | Performed by: STUDENT IN AN ORGANIZED HEALTH CARE EDUCATION/TRAINING PROGRAM

## 2022-10-23 RX ORDER — RUFINAMIDE 40 MG/ML
1 SUSPENSION ORAL DAILY
Status: DISCONTINUED | OUTPATIENT
Start: 2022-10-23 | End: 2022-10-26

## 2022-10-23 RX ORDER — CEFAZOLIN SODIUM/WATER 2 G/20 ML
2 SYRINGE (ML) INTRAVENOUS EVERY 12 HOURS
Status: DISCONTINUED | OUTPATIENT
Start: 2022-10-23 | End: 2022-10-26

## 2022-10-23 RX ORDER — ONDANSETRON 2 MG/ML
4 INJECTION INTRAMUSCULAR; INTRAVENOUS EVERY 6 HOURS PRN
Status: DISCONTINUED | OUTPATIENT
Start: 2022-10-23 | End: 2022-10-26

## 2022-10-23 RX ORDER — ASPIRIN 300 MG/1
300 SUPPOSITORY RECTAL ONCE
Status: COMPLETED | OUTPATIENT
Start: 2022-10-23 | End: 2022-10-23

## 2022-10-23 RX ORDER — GUAIFENESIN 600 MG/1
600 TABLET, EXTENDED RELEASE ORAL 2 TIMES DAILY
Status: DISCONTINUED | OUTPATIENT
Start: 2022-10-23 | End: 2022-10-26

## 2022-10-23 RX ORDER — CODEINE PHOSPHATE AND GUAIFENESIN 10; 100 MG/5ML; MG/5ML
5 SOLUTION ORAL EVERY 6 HOURS PRN
Status: DISCONTINUED | OUTPATIENT
Start: 2022-10-23 | End: 2022-10-26

## 2022-10-23 RX ORDER — ONDANSETRON 8 MG/1
8 TABLET, ORALLY DISINTEGRATING ORAL EVERY 8 HOURS PRN
Status: DISCONTINUED | OUTPATIENT
Start: 2022-10-23 | End: 2022-10-26

## 2022-10-23 RX ORDER — ASPIRIN 81 MG/1
81 TABLET, CHEWABLE ORAL DAILY
Status: DISCONTINUED | OUTPATIENT
Start: 2022-10-24 | End: 2022-10-26

## 2022-10-23 RX ORDER — LEVOFLOXACIN 500 MG/1
500 TABLET, FILM COATED ORAL DAILY
Status: DISCONTINUED | OUTPATIENT
Start: 2022-10-23 | End: 2022-10-23

## 2022-10-23 RX ORDER — ACETAMINOPHEN 500 MG
500 TABLET ORAL EVERY 4 HOURS PRN
Status: DISCONTINUED | OUTPATIENT
Start: 2022-10-23 | End: 2022-10-26

## 2022-10-23 RX ORDER — BISACODYL 10 MG
10 SUPPOSITORY, RECTAL RECTAL
Status: DISCONTINUED | OUTPATIENT
Start: 2022-10-23 | End: 2022-10-26

## 2022-10-23 RX ORDER — HYDRALAZINE HYDROCHLORIDE 20 MG/ML
10 INJECTION INTRAMUSCULAR; INTRAVENOUS EVERY 2 HOUR PRN
Status: DISCONTINUED | OUTPATIENT
Start: 2022-10-23 | End: 2022-10-26

## 2022-10-23 RX ORDER — SENNOSIDES 8.6 MG
17.2 TABLET ORAL NIGHTLY PRN
Status: DISCONTINUED | OUTPATIENT
Start: 2022-10-23 | End: 2022-10-26

## 2022-10-23 RX ORDER — POTASSIUM CHLORIDE 20 MEQ/1
40 TABLET, EXTENDED RELEASE ORAL 2 TIMES DAILY
Status: DISCONTINUED | OUTPATIENT
Start: 2022-10-23 | End: 2022-10-26

## 2022-10-23 RX ORDER — METOCLOPRAMIDE HYDROCHLORIDE 5 MG/ML
5 INJECTION INTRAMUSCULAR; INTRAVENOUS EVERY 8 HOURS PRN
Status: DISCONTINUED | OUTPATIENT
Start: 2022-10-23 | End: 2022-10-26

## 2022-10-23 RX ORDER — POLYETHYLENE GLYCOL 3350 17 G/17G
17 POWDER, FOR SOLUTION ORAL DAILY PRN
Status: DISCONTINUED | OUTPATIENT
Start: 2022-10-23 | End: 2022-10-26

## 2022-10-23 RX ORDER — MELATONIN
3 NIGHTLY PRN
Status: DISCONTINUED | OUTPATIENT
Start: 2022-10-23 | End: 2022-10-26

## 2022-10-23 RX ORDER — LABETALOL HYDROCHLORIDE 5 MG/ML
10 INJECTION, SOLUTION INTRAVENOUS EVERY 10 MIN PRN
Status: DISCONTINUED | OUTPATIENT
Start: 2022-10-23 | End: 2022-10-26

## 2022-10-23 RX ORDER — LEVOTHYROXINE SODIUM 0.07 MG/1
75 TABLET ORAL
Status: DISCONTINUED | OUTPATIENT
Start: 2022-10-23 | End: 2022-10-26

## 2022-10-23 RX ORDER — FAMOTIDINE 20 MG/1
20 TABLET, FILM COATED ORAL DAILY
Status: DISCONTINUED | OUTPATIENT
Start: 2022-10-23 | End: 2022-10-26

## 2022-10-23 RX ORDER — ATORVASTATIN CALCIUM 40 MG/1
40 TABLET, FILM COATED ORAL NIGHTLY
Status: DISCONTINUED | OUTPATIENT
Start: 2022-10-23 | End: 2022-10-26

## 2022-10-23 RX ORDER — LOPERAMIDE HYDROCHLORIDE 2 MG/1
4 CAPSULE ORAL 4 TIMES DAILY PRN
Status: DISCONTINUED | OUTPATIENT
Start: 2022-10-23 | End: 2022-10-26

## 2022-10-23 RX ORDER — BENZONATATE 100 MG/1
200 CAPSULE ORAL 3 TIMES DAILY PRN
Status: DISCONTINUED | OUTPATIENT
Start: 2022-10-23 | End: 2022-10-26

## 2022-10-23 RX ORDER — ACETAMINOPHEN 500 MG
500 TABLET ORAL EVERY 6 HOURS PRN
Status: DISCONTINUED | OUTPATIENT
Start: 2022-10-23 | End: 2022-10-26

## 2022-10-23 RX ORDER — HEPARIN SODIUM 5000 [USP'U]/ML
5000 INJECTION, SOLUTION INTRAVENOUS; SUBCUTANEOUS EVERY 8 HOURS SCHEDULED
Status: DISCONTINUED | OUTPATIENT
Start: 2022-10-23 | End: 2022-10-26

## 2022-10-23 NOTE — ED INITIAL ASSESSMENT (HPI)
Per ems patient presents with slurred speech, left sided facial droop, per ems patient LKW 40 minutes ago

## 2022-10-23 NOTE — ED QUICK NOTES
Orders for admission, patient is aware of plan and ready to go upstairs.  Any questions, please call ED RN leonardo at extension [phone]    Patient Covid vaccination status: Fully vaccinated     COVID Test Ordered in ED: Rapid SARS-CoV-2 by PCR    COVID Suspicion at Admission: pos    Running Infusions:  None    Mental Status/LOC at time of transport: x1    Other pertinent information:   CIWA score: N/A   NIH score:  14

## 2022-10-24 ENCOUNTER — APPOINTMENT (OUTPATIENT)
Dept: CV DIAGNOSTICS | Facility: HOSPITAL | Age: 87
End: 2022-10-24
Attending: Other
Payer: MEDICARE

## 2022-10-24 PROBLEM — Z71.89 ADVANCE CARE PLANNING: Status: ACTIVE | Noted: 2022-10-24

## 2022-10-24 PROBLEM — Z51.5 PALLIATIVE CARE BY SPECIALIST: Status: ACTIVE | Noted: 2022-10-24

## 2022-10-24 PROBLEM — Z71.89 GOALS OF CARE, COUNSELING/DISCUSSION: Status: ACTIVE | Noted: 2022-10-24

## 2022-10-24 LAB
GLUCOSE BLDC GLUCOMTR-MCNC: 101 MG/DL (ref 70–99)
GLUCOSE BLDC GLUCOMTR-MCNC: 111 MG/DL (ref 70–99)
GLUCOSE BLDC GLUCOMTR-MCNC: 85 MG/DL (ref 70–99)
GLUCOSE BLDC GLUCOMTR-MCNC: 86 MG/DL (ref 70–99)

## 2022-10-24 PROCEDURE — 99222 1ST HOSP IP/OBS MODERATE 55: CPT | Performed by: OTHER

## 2022-10-24 PROCEDURE — 99221 1ST HOSP IP/OBS SF/LOW 40: CPT | Performed by: REGISTERED NURSE

## 2022-10-24 NOTE — CM/SW NOTE
Patient can return to [facility] when discharged. Meeting with hospice tomorrow. / to remain available for support and/or discharge planning.      Crow Christie MBA BSN RN [address]  RN Case Manager  [phone]

## 2022-10-24 NOTE — PLAN OF CARE
Problem: Patient Centered Care  Goal: Patient preferences are identified and integrated in the patient's plan of care  Description: Interventions:  - What would you like us to know as we care for you?   - Provide timely, complete, and accurate information to patient/family  - Incorporate patient and family knowledge, values, beliefs, and cultural backgrounds into the planning and delivery of care  - Encourage patient/family to participate in care and decision-making at the level they choose  - Honor patient and family perspectives and choices  Outcome: Progressing     Problem: Patient/Family Goals  Goal: Patient/Family Long Term Goal  Description: Patient's Long Term Goal:     Interventions:  -   - See additional Care Plan goals for specific interventions  Outcome: Progressing  Goal: Patient/Family Short Term Goal  Description: Patient's Short Term Goal:     Interventions:   -  - See additional Care Plan goals for specific interventions  Outcome: Progressing     Problem: NEUROLOGICAL - ADULT  Goal: Achieves stable or improved neurological status  Description: INTERVENTIONS  - Assess for and report changes in neurological status  - Initiate measures to prevent increased intracranial pressure  - Maintain blood pressure and fluid volume within ordered parameters to optimize cerebral perfusion and minimize risk of hemorrhage  - Monitor temperature, glucose, and sodium.  Initiate appropriate interventions as ordered  Outcome: Progressing  Goal: Absence of seizures  Description: INTERVENTIONS  - Monitor for seizure activity  - Administer anti-seizure medications as ordered  - Monitor neurological status  Outcome: Progressing  Goal: Remains free of injury related to seizure activity  Description: INTERVENTIONS:  - Maintain airway, patient safety  and administer oxygen as ordered  - Monitor patient for seizure activity, document and report duration and description of seizure to MD/LIP  - If seizure occurs, turn patient to side and suction secretions as needed  - Reorient patient post seizure  - Seizure pads on all 4 side rails  - Instruct patient/family to notify RN of any seizure activity  - Instruct patient/family to call for assistance with activity based on assessment  Outcome: Progressing  Goal: Achieves maximal functionality and self care  Description: INTERVENTIONS  - Monitor swallowing and airway patency with patient fatigue and changes in neurological status  - Encourage and assist patient to increase activity and self care with guidance from PT/OT  - Encourage visually impaired, hearing impaired and aphasic patients to use assistive/communication devices  Outcome: Progressing     Problem: Impaired Swallowing  Goal: Minimize aspiration risk  Description: Interventions:  - Patient should be alert and upright for all feedings (90 degrees preferred)  - Offer food and liquids at a slow rate  - No straws  - Encourage small bites of food and small sips of liquid  - Offer pills one at a time, crush or deliver with applesauce as needed  - Discontinue feeding and notify MD (or speech pathologist) if coughing or persistent throat clearing or wet/gurgly vocal quality is noted  Outcome: Progressing     Problem: Altered Communication/Language Barrier  Goal: Patient/Family is able to understand and participate in their care  Description: Interventions:  - Assess communication ability and preferred communication style  - Implement communication aides and strategies  - Use visual cues when possible  - Listen attentively, be patient, do not interrupt  - Minimize distractions  - Allow time for understanding and response  - Establish method for patient to ask for assistance (call light)  - Provide an  as needed  - Communicate barriers and strategies to overcome with those who interact with patient  Outcome: Progressing   Alert and oriented x1. Slurred speech and L sided facial droop present. Neuro checks q2. IV ancef for UTI.  Fall at [phone], documentation done. Plan for echo and MRI today.

## 2022-10-24 NOTE — SPIRITUAL CARE NOTE
RN report family request . Niece at bedside. Writer report offering empathic listening and words of comfort. Patient was able to communicate her love for God. Patient celebrated her 80 birthday yesterday. Patient is at peace with God. Writer extended prayer for comfort and peace for patient and family. Follow up as requested.     [address], [address]   [id number]     On call  services [phone]

## 2022-10-24 NOTE — PROGRESS NOTES
Significant Event - Fall Note    Date/Time of Fall: October 23, 2022 at 65    Fall huddle completed: Yes    Description of patient fall:     Patient fell from: Bed     Activity when fall occurred: Ambulating without assistance or assistive devices     Where did fall occur: Patient room     Was the fall assisted: Found on floor/unassisted to floor    Who witnessed the fall: Unwitnessed    Patient narrative of fall: NASRIN, patient confused. Staff narrative of fall: Alarm heard, nurse responded to room to find patient sitting on the ground. PCT and nurse assisted patient back into bed.     Name of Provider notified of fall:  Dr. Pearl España notification: Family notified    Factors contributing to fall:     Physical: Weakness/fatigue     Psychological: Confused     Environmental: Poor lighting     Medications received in the past 8 hours:   Medication(s) Administered in past 8 Hours from 10/23/2022 1503 to 10/23/2022 2303     Date/Time Order Dose Route Action Action by Comments    10/23/2022 1820 aspirin 300 MG rectal suppository 300 mg 300 mg Rectal Given Clau Pettit RN     10/23/2022 2225 ceFAZolin (Ancef) 2 g in 20mL IV syringe premix 2 g Intravenous Given Cristhian Triplett RN     10/23/2022 2225 heparin (Porcine) 5000 UNIT/ML injection 5,000 Units 5,000 Units Subcutaneous Given Cristhian Triplett RN     10/23/2022 1720 iopamidol 76% (ISOVUE-370) injection for power injector 80 mL Intravenous Given August Sis           Was patient identified as high fall risk prior to fall:                  Score: 60             What interventions were in place prior to fall: Bed alarm, Bed in lowest position, Call light within reach, [place] footwear and Personal items within reach    Interventions post fall: Bed alarm, Bed in lowest position, Camera, Call light within reach, Fall alert wristband, [place] footwear, Patient education tool, Personal items within reach, Rounding and Signage in place    Additional comments:

## 2022-10-24 NOTE — CM/SW NOTE
Department  notified of request for [facility] referrals started. Assigned CM/SW to follow up with pt/family on further discharge planning.      Guera Brock   October 24, 2022   15:00

## 2022-10-24 NOTE — CM/SW NOTE
Patient was a discharge from [facility] a few days ago. New dx of stroke. [facility]. New orders for palliative and hospice. CM to follow-up with family after consults are completed to determine dc needs. / to remain available for support and/or discharge planning.      Jackson Rodríguez MBA BSN RN [address]  RN Case Manager  [phone]

## 2022-10-24 NOTE — PROGRESS NOTES
[facility] received referral for patient. Plan to meet family tomorrow, 10/25 at 130pm to discuss hospice.      Jo Jiang  Residential Liaison  [phone]

## 2022-10-24 NOTE — PLAN OF CARE
Patient alert at times and lethargic at times. Oriented x1. On 4 liters of oxygen. Patient denies pain. Fall precautions in place, bed alarm (zone 2), non slip socks, camera, frequent rounding. Family at bedside this AM,  at bedside. This RN spoke with patients son & Sariah Cousins, who states \"Just make her as comfortable as possible. None of these tests are going to make her 27years old again. As a family we want her to be comfortable\". Son, refusing blood draws for patient. Plan is for hospice meeting tomorrow. Family updated on plan of care. Problem: Patient Centered Care  Goal: Patient preferences are identified and integrated in the patient's plan of care  Description: Interventions:  - What would you like us to know as we care for you?  I have two sons  - Provide timely, complete, and accurate information to patient/family  - Incorporate patient and family knowledge, values, beliefs, and cultural backgrounds into the planning and delivery of care  - Encourage patient/family to participate in care and decision-making at the level they choose  - Honor patient and family perspectives and choices  Outcome: Progressing     Problem: Patient/Family Goals  Goal: Patient/Family Long Term Goal  Description: Patient's Long Term Goal: to make her as comfortable as possible    Interventions:  -medications  - frequent rounding  - repositioning  - See additional Care Plan goals for specific interventions  Outcome: Progressing  Goal: Patient/Family Short Term Goal  Description: Patient's Short Term Goal: keep family updated    Interventions:   - phone calls  - See additional Care Plan goals for specific interventions  Outcome: Progressing     Problem: NEUROLOGICAL - ADULT  Goal: Achieves stable or improved neurological status  Description: INTERVENTIONS  - Assess for and report changes in neurological status  - Initiate measures to prevent increased intracranial pressure  - Maintain blood pressure and fluid volume within ordered parameters to optimize cerebral perfusion and minimize risk of hemorrhage  - Monitor temperature, glucose, and sodium.  Initiate appropriate interventions as ordered  Outcome: Progressing  Goal: Absence of seizures  Description: INTERVENTIONS  - Monitor for seizure activity  - Administer anti-seizure medications as ordered  - Monitor neurological status  Outcome: Progressing  Goal: Remains free of injury related to seizure activity  Description: INTERVENTIONS:  - Maintain airway, patient safety  and administer oxygen as ordered  - Monitor patient for seizure activity, document and report duration and description of seizure to MD/LIP  - If seizure occurs, turn patient to side and suction secretions as needed  - Reorient patient post seizure  - Seizure pads on all 4 side rails  - Instruct patient/family to notify RN of any seizure activity  - Instruct patient/family to call for assistance with activity based on assessment  Outcome: Progressing  Goal: Achieves maximal functionality and self care  Description: INTERVENTIONS  - Monitor swallowing and airway patency with patient fatigue and changes in neurological status  - Encourage and assist patient to increase activity and self care with guidance from PT/OT  - Encourage visually impaired, hearing impaired and aphasic patients to use assistive/communication devices  Outcome: Progressing     Problem: Impaired Swallowing  Goal: Minimize aspiration risk  Description: Interventions:  - Patient should be alert and upright for all feedings (90 degrees preferred)  - Offer food and liquids at a slow rate  - No straws  - Encourage small bites of food and small sips of liquid  - Offer pills one at a time, crush or deliver with applesauce as needed  - Discontinue feeding and notify MD (or speech pathologist) if coughing or persistent throat clearing or wet/gurgly vocal quality is noted  Outcome: Progressing     Problem: Altered Communication/Language Barrier  Goal: Patient/Family is able to understand and participate in their care  Description: Interventions:  - Assess communication ability and preferred communication style  - Implement communication aides and strategies  - Use visual cues when possible  - Listen attentively, be patient, do not interrupt  - Minimize distractions  - Allow time for understanding and response  - Establish method for patient to ask for assistance (call light)  - Provide an  as needed  - Communicate barriers and strategies to overcome with those who interact with patient  Outcome: Progressing

## 2022-10-24 NOTE — OCCUPATIONAL THERAPY NOTE
OT order was received, chart review was completed and this therapist checked with RN and any updated notes since am when this therapist reviewed chart. 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 Possible seizure activity. There is a new palliative/hospice consult order. And RN reported pt was lethargic. Hold OT at this time until family decides regarding hospice.     Joann Recinos, OTR/L  [address]

## 2022-10-25 NOTE — PLAN OF CARE
Patient lethargic today. Remains on 3 liters of oxygen. Hospice meeting this afternoon. Plan is for patient to discharge back to [facility] on hospice tomorrow. Family updated on plan of care. Problem: Patient Centered Care  Goal: Patient preferences are identified and integrated in the patient's plan of care  Description: Interventions:  - What would you like us to know as we care for you? I have two sons  - Provide timely, complete, and accurate information to patient/family  - Incorporate patient and family knowledge, values, beliefs, and cultural backgrounds into the planning and delivery of care  - Encourage patient/family to participate in care and decision-making at the level they choose  - Honor patient and family perspectives and choices  Outcome: Progressing     Problem: Patient/Family Goals  Goal: Patient/Family Long Term Goal  Description: Patient's Long Term Goal: Keep patient as comfortable as possible    Interventions:  - medication  - frequent rounding   - repositioning  - See additional Care Plan goals for specific interventions  Outcome: Progressing  Goal: Patient/Family Short Term Goal  Description: Patient's Short Term Goal: manage pain    Interventions:   - medications  - nonpharmological  - See additional Care Plan goals for specific interventions  Outcome: Progressing     Problem: NEUROLOGICAL - ADULT  Goal: Achieves stable or improved neurological status  Description: INTERVENTIONS  - Assess for and report changes in neurological status  - Initiate measures to prevent increased intracranial pressure  - Maintain blood pressure and fluid volume within ordered parameters to optimize cerebral perfusion and minimize risk of hemorrhage  - Monitor temperature, glucose, and sodium.  Initiate appropriate interventions as ordered  Outcome: Progressing  Goal: Absence of seizures  Description: INTERVENTIONS  - Monitor for seizure activity  - Administer anti-seizure medications as ordered  - Monitor neurological status  Outcome: Progressing  Goal: Remains free of injury related to seizure activity  Description: INTERVENTIONS:  - Maintain airway, patient safety  and administer oxygen as ordered  - Monitor patient for seizure activity, document and report duration and description of seizure to MD/LIP  - If seizure occurs, turn patient to side and suction secretions as needed  - Reorient patient post seizure  - Seizure pads on all 4 side rails  - Instruct patient/family to notify RN of any seizure activity  - Instruct patient/family to call for assistance with activity based on assessment  Outcome: Progressing  Goal: Achieves maximal functionality and self care  Description: INTERVENTIONS  - Monitor swallowing and airway patency with patient fatigue and changes in neurological status  - Encourage and assist patient to increase activity and self care with guidance from PT/OT  - Encourage visually impaired, hearing impaired and aphasic patients to use assistive/communication devices  Outcome: Progressing     Problem: Impaired Swallowing  Goal: Minimize aspiration risk  Description: Interventions:  - Patient should be alert and upright for all feedings (90 degrees preferred)  - Offer food and liquids at a slow rate  - No straws  - Encourage small bites of food and small sips of liquid  - Offer pills one at a time, crush or deliver with applesauce as needed  - Discontinue feeding and notify MD (or speech pathologist) if coughing or persistent throat clearing or wet/gurgly vocal quality is noted  Outcome: Progressing     Problem: Altered Communication/Language Barrier  Goal: Patient/Family is able to understand and participate in their care  Description: Interventions:  - Assess communication ability and preferred communication style  - Implement communication aides and strategies  - Use visual cues when possible  - Listen attentively, be patient, do not interrupt  - Minimize distractions  - Allow time for understanding and response  - Establish method for patient to ask for assistance (call light)  - Provide an  as needed  - Communicate barriers and strategies to overcome with those who interact with patient  Outcome: Progressing

## 2022-10-25 NOTE — PLAN OF CARE
NEUROLOGY PLAN OF CARE NOTE    Noted plans for hospice care. Discussed and confirmed with RN that son [facility]) does not want any testing or procedures that will not contribute to making patient comfortable. Thus, MRI and EEG were cancelled. Will hold off on further testing or procedures. Will hold off on any seizure medications as these may have side effects and she has not had a clinical seizure since admission. Please call neurology back with any further questions.       MYLA Bryant DO  10/25/2022 9:24 AM

## 2022-10-25 NOTE — HOSPICE RN NOTE
[facility] to admit tomorrow at [facility]. Consents are signed and DME and medications are being delivered to the SNF. POC discussed with Yanique Harry. POC discussed with Dr Pebbles Whitney.   Amelia Stephen RN [facility]   [facility] [id number]

## 2022-10-25 NOTE — CM/SW NOTE
DHEERAJ received confirmation from [facility], transport set for 3pm, Wed the 26th.   DHEERAJ Leiva  [facility]  [phone]

## 2022-10-25 NOTE — OCCUPATIONAL THERAPY NOTE
OT orders written on 10/23. Hospice meeting is scheduled with family today. Will hold therapy services at this time with plan to check on pt status tomorrow, 10/26.

## 2022-10-25 NOTE — PLAN OF CARE
Pt resting in bed. Weaning O2 down as tolerated. Purewick in place. Fall precautions in place, call light within reach. Pending med clear, hospice meeting with family later in the day. Problem: Patient Centered Care  Goal: Patient preferences are identified and integrated in the patient's plan of care  Description: Interventions:  - What would you like us to know as we care for you?   - Provide timely, complete, and accurate information to patient/family  - Incorporate patient and family knowledge, values, beliefs, and cultural backgrounds into the planning and delivery of care  - Encourage patient/family to participate in care and decision-making at the level they choose  - Honor patient and family perspectives and choices  Outcome: Progressing     Problem: Patient/Family Goals  Goal: Patient/Family Long Term Goal  Description: Patient's Long Term Goal:     Interventions:  -   - See additional Care Plan goals for specific interventions  Outcome: Progressing  Goal: Patient/Family Short Term Goal  Description: Patient's Short Term Goal:     Interventions:   -   - See additional Care Plan goals for specific interventions  Outcome: Progressing     Problem: NEUROLOGICAL - ADULT  Goal: Achieves stable or improved neurological status  Description: INTERVENTIONS  - Assess for and report changes in neurological status  - Initiate measures to prevent increased intracranial pressure  - Maintain blood pressure and fluid volume within ordered parameters to optimize cerebral perfusion and minimize risk of hemorrhage  - Monitor temperature, glucose, and sodium.  Initiate appropriate interventions as ordered  Outcome: Progressing  Goal: Absence of seizures  Description: INTERVENTIONS  - Monitor for seizure activity  - Administer anti-seizure medications as ordered  - Monitor neurological status  Outcome: Progressing  Goal: Remains free of injury related to seizure activity  Description: INTERVENTIONS:  - Maintain airway, patient safety  and administer oxygen as ordered  - Monitor patient for seizure activity, document and report duration and description of seizure to MD/LIP  - If seizure occurs, turn patient to side and suction secretions as needed  - Reorient patient post seizure  - Seizure pads on all 4 side rails  - Instruct patient/family to notify RN of any seizure activity  - Instruct patient/family to call for assistance with activity based on assessment  Outcome: Progressing  Goal: Achieves maximal functionality and self care  Description: INTERVENTIONS  - Monitor swallowing and airway patency with patient fatigue and changes in neurological status  - Encourage and assist patient to increase activity and self care with guidance from PT/OT  - Encourage visually impaired, hearing impaired and aphasic patients to use assistive/communication devices  Outcome: Progressing     Problem: Impaired Swallowing  Goal: Minimize aspiration risk  Description: Interventions:  - Patient should be alert and upright for all feedings (90 degrees preferred)  - Offer food and liquids at a slow rate  - No straws  - Encourage small bites of food and small sips of liquid  - Offer pills one at a time, crush or deliver with applesauce as needed  - Discontinue feeding and notify MD (or speech pathologist) if coughing or persistent throat clearing or wet/gurgly vocal quality is noted  Outcome: Progressing     Problem: Altered Communication/Language Barrier  Goal: Patient/Family is able to understand and participate in their care  Description: Interventions:  - Assess communication ability and preferred communication style  - Implement communication aides and strategies  - Use visual cues when possible  - Listen attentively, be patient, do not interrupt  - Minimize distractions  - Allow time for understanding and response  - Establish method for patient to ask for assistance (call light)  - Provide an  as needed  - Communicate barriers and strategies to overcome with those who interact with patient  Outcome: Progressing

## 2022-10-25 NOTE — PHYSICAL THERAPY NOTE
PT orders received, chart reviewed. Family is meeting today to discuss possible hospice, do not want to do anything to cause undue stress to patient. Will check back tomorrow pending family decision.

## 2022-10-25 NOTE — CM/SW NOTE
MSW met with family to sign consents for hospice EOB/SOC elected for tomorrow, 10/26/22. Team awaiting confirmation from [facility] to return to [facility] team to arrange transport for tomorrow , 3PM once cleared to return.   DHEERAJ Lea  [facility]  [phone]

## 2022-10-26 VITALS
OXYGEN SATURATION: 92 % | WEIGHT: 170 LBS | SYSTOLIC BLOOD PRESSURE: 152 MMHG | RESPIRATION RATE: 14 BRPM | BODY MASS INDEX: 28 KG/M2 | TEMPERATURE: 98 F | HEART RATE: 97 BPM | DIASTOLIC BLOOD PRESSURE: 90 MMHG

## 2022-10-26 RX ORDER — ATORVASTATIN CALCIUM 40 MG/1
40 TABLET, FILM COATED ORAL NIGHTLY
Refills: 0 | Status: SHIPPED | COMMUNITY
Start: 2022-10-26

## 2022-10-26 RX ORDER — ASPIRIN 81 MG/1
81 TABLET, CHEWABLE ORAL DAILY
Refills: 0 | Status: SHIPPED | COMMUNITY
Start: 2022-10-27

## 2022-10-26 NOTE — PLAN OF CARE
Patient a/o x1, pleasantly confused, on 3L NC. NIHSS and neuro check done. No acute changes noted. Medically cleared for discharge. Pt discharging to [place] [id number] with hospice. This RN called [place] [address] facility and report was given to [facility], who verbalized understanding and had no further questions. Tele, IV, and ID band removed. Pt picked up by [facility] ambulance and transported with all belongings. Problem: Patient Centered Care  Goal: Patient preferences are identified and integrated in the patient's plan of care  Description: Interventions:  - What would you like us to know as we care for you? I live at [place] [address]. - Provide timely, complete, and accurate information to patient/family  - Incorporate patient and family knowledge, values, beliefs, and cultural backgrounds into the planning and delivery of care  - Encourage patient/family to participate in care and decision-making at the level they choose  - Honor patient and family perspectives and choices  Outcome: Completed     Problem: Patient/Family Goals  Goal: Patient/Family Long Term Goal  Description: Patient's Long Term Goal: To go home    Interventions:  - Tests (CT, CTA, chest xray)  - See additional Care Plan goals for specific interventions  Outcome: Completed  Goal: Patient/Family Short Term Goal  Description: Patient's Short Term Goal: Improve quality of life    Interventions:   - Hospice  - pain management as needed  - See additional Care Plan goals for specific interventions  Outcome: Completed     Problem: NEUROLOGICAL - ADULT  Goal: Achieves stable or improved neurological status  Description: INTERVENTIONS  - Assess for and report changes in neurological status  - Initiate measures to prevent increased intracranial pressure  - Maintain blood pressure and fluid volume within ordered parameters to optimize cerebral perfusion and minimize risk of hemorrhage  - Monitor temperature, glucose, and sodium.  Initiate appropriate interventions as ordered  Outcome: Completed  Goal: Absence of seizures  Description: INTERVENTIONS  - Monitor for seizure activity  - Administer anti-seizure medications as ordered  - Monitor neurological status  Outcome: Completed  Goal: Remains free of injury related to seizure activity  Description: INTERVENTIONS:  - Maintain airway, patient safety  and administer oxygen as ordered  - Monitor patient for seizure activity, document and report duration and description of seizure to MD/LIP  - If seizure occurs, turn patient to side and suction secretions as needed  - Reorient patient post seizure  - Seizure pads on all 4 side rails  - Instruct patient/family to notify RN of any seizure activity  - Instruct patient/family to call for assistance with activity based on assessment  Outcome: Completed  Goal: Achieves maximal functionality and self care  Description: INTERVENTIONS  - Monitor swallowing and airway patency with patient fatigue and changes in neurological status  - Encourage and assist patient to increase activity and self care with guidance from PT/OT  - Encourage visually impaired, hearing impaired and aphasic patients to use assistive/communication devices  Outcome: Completed     Problem: Impaired Swallowing  Goal: Minimize aspiration risk  Description: Interventions:  - Patient should be alert and upright for all feedings (90 degrees preferred)  - Offer food and liquids at a slow rate  - No straws  - Encourage small bites of food and small sips of liquid  - Offer pills one at a time, crush or deliver with applesauce as needed  - Discontinue feeding and notify MD (or speech pathologist) if coughing or persistent throat clearing or wet/gurgly vocal quality is noted  Outcome: Completed     Problem: Altered Communication/Language Barrier  Goal: Patient/Family is able to understand and participate in their care  Description: Interventions:  - Assess communication ability and preferred communication style  - Implement communication aides and strategies  - Use visual cues when possible  - Listen attentively, be patient, do not interrupt  - Minimize distractions  - Allow time for understanding and response  - Establish method for patient to ask for assistance (call light)  - Provide an  as needed  - Communicate barriers and strategies to overcome with those who interact with patient  Outcome: Completed

## 2022-10-26 NOTE — HOSPICE RN NOTE
[facility] patient later today back at [address]. She will get picked up at 3pm by [facility]. POC discussed with [facility]  Reuben Perez RN [id number] [facility] [id number]

## 2022-10-26 NOTE — CDS QUERY
How to Answer this Query    1.) Click \"Edit Button\" on the toolbar  2.) Type an \"X\" in the bracket for the diagnosis that applies. (You may also add additional clinical details as you feel necessary to substantiate your response). 3.) Finally click \"Sign\" to complete response. Thank you     Clarification - NSTEMI  CLINICAL DOCUMENTATION CLARIFICATION FORM     By submitting this query, we are merely seeking further clarification of documentation to accurately reflect all conditions that you are monitoring, evaluating, treating or that extend the hospitalization or utilize additional resources of care. Please utilize your independent clinical judgment when addressing the question(s) below. Dear Doctor  Arya Verde:     Please specify all associated diagnoses with NSTEMI:     (  x)  Type 1   Non-ST elevation MI (NSTEMI)    (  ) Type 2 NSTEMI (due demand ischemia):  please indicate      Cause: ___________________________     (  ) Other (please specify): _____________________    (  ) Unable to determine       PLEASE DOCUMENT ANY ADDITIONAL DIAGNOSES AND/OR Specificity     ____________________________________________________  Clinical Indicators:  H&P:  COVID-19   + 10/23/2022   NSTEMI (non-ST elevated myocardial infarction) ([facility] [address]  EKG reviewed   Initial troponin elevated   Acute CVA   Carotid artery stenosis   HTN    Discharge Diagnosis:   . Principal Problem:    Acute CVA (cerebrovascular accident) [facility])  Active Problems:    NSTEMI (non-ST elevated myocardial infarction) ([facility] [address]    Acute respiratory failure with hypoxia       10-23 Troponin I (High Sensitivity)   <=54 ng/L 234 High Panic        10-23 EKG: Sinus Rhythm -Right bundle branch block. ABNORMAL When compared with ECG of 10/19/2022 18:39:07 No significant changes have occurred       If you have any questions, please contact Clinical :  Melva Salter RN CCDS at [phone]     Thank You!   THIS FORM IS A PERMANENT PART OF THE MEDICAL RECORD

## 2022-10-26 NOTE — PLAN OF CARE
Pt has been lethargic for majority of shift but will respond to conversation when staff is in room. Oriented x1 to self. On 3L O2 with nasal cannula with reinforcement when it falls out; O2 sats around 94. Plan to be discharged today to [address] [place] [address] today at 3pm.   Problem: Patient Centered Care  Goal: Patient preferences are identified and integrated in the patient's plan of care  Description: Interventions:  - What would you like us to know as we care for you? I have two sons  - Provide timely, complete, and accurate information to patient/family  - Incorporate patient and family knowledge, values, beliefs, and cultural backgrounds into the planning and delivery of care  - Encourage patient/family to participate in care and decision-making at the level they choose  - Honor patient and family perspectives and choices  Outcome: Progressing     Problem: Patient/Family Goals  Goal: Patient/Family Long Term Goal  Description: Patient's Long Term Goal: Keep as comfortable as possible    Interventions:  - Medication  - Frequent rounding  - Repositioning  - See additional Care Plan goals for specific interventions  Outcome: Progressing  Goal: Patient/Family Short Term Goal  Description: Patient's Short Term Goal: Manage pain    Interventions:   - Repositioning with pillows  - Medication  - See additional Care Plan goals for specific interventions  Outcome: Progressing     Problem: NEUROLOGICAL - ADULT  Goal: Achieves stable or improved neurological status  Description: INTERVENTIONS  - Assess for and report changes in neurological status  - Initiate measures to prevent increased intracranial pressure  - Maintain blood pressure and fluid volume within ordered parameters to optimize cerebral perfusion and minimize risk of hemorrhage  - Monitor temperature, glucose, and sodium.  Initiate appropriate interventions as ordered  Outcome: Progressing  Goal: Absence of seizures  Description: INTERVENTIONS  - Monitor for seizure activity  - Administer anti-seizure medications as ordered  - Monitor neurological status  Outcome: Progressing  Goal: Remains free of injury related to seizure activity  Description: INTERVENTIONS:  - Maintain airway, patient safety  and administer oxygen as ordered  - Monitor patient for seizure activity, document and report duration and description of seizure to MD/LIP  - If seizure occurs, turn patient to side and suction secretions as needed  - Reorient patient post seizure  - Seizure pads on all 4 side rails  - Instruct patient/family to notify RN of any seizure activity  - Instruct patient/family to call for assistance with activity based on assessment  Outcome: Progressing  Goal: Achieves maximal functionality and self care  Description: INTERVENTIONS  - Monitor swallowing and airway patency with patient fatigue and changes in neurological status  - Encourage and assist patient to increase activity and self care with guidance from PT/OT  - Encourage visually impaired, hearing impaired and aphasic patients to use assistive/communication devices  Outcome: Progressing     Problem: Impaired Swallowing  Goal: Minimize aspiration risk  Description: Interventions:  - Patient should be alert and upright for all feedings (90 degrees preferred)  - Offer food and liquids at a slow rate  - No straws  - Encourage small bites of food and small sips of liquid  - Offer pills one at a time, crush or deliver with applesauce as needed  - Discontinue feeding and notify MD (or speech pathologist) if coughing or persistent throat clearing or wet/gurgly vocal quality is noted  Outcome: Progressing     Problem: Altered Communication/Language Barrier  Goal: Patient/Family is able to understand and participate in their care  Description: Interventions:  - Assess communication ability and preferred communication style  - Implement communication aides and strategies  - Use visual cues when possible  - Listen attentively, be patient, do not interrupt  - Minimize distractions  - Allow time for understanding and response  - Establish method for patient to ask for assistance (call light)  - Provide an  as needed  - Communicate barriers and strategies to overcome with those who interact with patient  Outcome: Progressing

## 2022-10-26 NOTE — DISCHARGE PLANNING
Patient chart reviewed for discharge: Medication Reconciliation completed and Education included in After Visit Summary. Discharge RN notified patient's RN of AVS completion.  Plan for patient to discharge to [place] [id number] with Hospice with ambulance  time of Irma Cheadle RN, Discharge Leader [id number]

## (undated) NOTE — IP AVS SNAPSHOT
[facility]            (For Outpatient Use Only) Initial Admit Date: 10/23/2022   Inpt/Obs Admit Date: Inpt: 10/23/22 / Obs: N/A   Discharge Date:    Miesha Villa:  [de-identified]   MRN: [de-identified]   CSN: [id number]   CEID: [id number]        ENCOUNTER  Patient Class: Inpatient Admitting Provider: Patricio Cuadra MD Unit: [address] Service: Cardiac Telemetry Attending Provider: Geri Berrios MD   Bed: 348-A   Visit Type:   Referring Physician: No ref. provider found Billing Flag:    Admit Diagnosis: NSTEMI (non-ST elevated myocardial infarction) [facility]) [I21.4]      PATIENT  Legal Name:   Brittany Jackson    Legal Sex: Female  Gender ID:              [address] [facility] Name:    PCP:  Shea Suarez MD Home: [phone]   Address:  [place] [address] : 10/23/1924 (89 yrs) Mobile: No mobile phone on file. City/State/Zip: [place], [place] [address] Marital: Unknown Language: English    [facility] [id number] Uatsdin: No Uatsdin Given-No Anna*     Race: White Ethnicity: Non  Or  O*   EMERGENCY CONTACT   Name Relationship Legal Guardian? Home Phone Work Phone Mobile Phone   1. RICHARD HAND  2.  Meliton Hand Ab Mckeon Pura      [phone]    [phone]       GUARANTOR  Guarantor: Eze Moncada : 10/23/1924 Home Phone: [phone]   Address: [address] [place] [address] Sex: Female Work Phone:    City/State/Zip: [place]   Rel. to Patient: Self Guarantor ID: [id number]   [address]   Employer:  Status: RETIRED     COVERAGE  PRIMARY INSURANCE   Payor: MEDICARE Plan: MEDICARE PART A&B   Group Number:  Insurance Type: INDEMNITY   Subscriber Name: Delores Luna : 10/23/1924   Subscriber ID: [id number] Pt Rel to Subscriber: Self   SECONDARY INSURANCE   Payor:  Plan:    Group Number:  Insurance Type:    Subscriber Name:  Subscriber :    Subscriber ID:  Pt Rel to Subscriber:    TERTIARY INSURANCE   Payor:  Plan:    Group Number:  Insurance Type:    Subscriber Name:  Subscriber :    Subscriber ID:  Pt Rel to Subscriber:    Hospital Account Financial Class: Medicare    2022

## (undated) NOTE — IP AVS SNAPSHOT
[facility]            (For Outpatient Use Only) Initial Admit Date: 10/19/2022   Inpt/Obs Admit Date: Inpt: 10/20/22 / Obs: N/A   Discharge Date:    Mica Redman:  [de-identified]   MRN: [de-identified]   CSN: [id number]   CEID: [id number]        ENCOUNTER  Patient Class: Inpatient Admitting Provider: Teena Downs MD Unit: [address] [place] [address]//SE   Hospital Service: Medical Attending Provider: Teena Downs MD   Bed: 124-D   Visit Type:   Referring Physician: No ref. provider found Billing Flag:    Admit Diagnosis: Hyperkalemia [E87.5]      PATIENT  Legal Name:   Mickey Raman    Legal Sex: Female  Gender ID:              [address] [facility] Name:    PCP:  Kimberly Mcneil MD Home: [phone]   Address:  [facility]* : 10/23/1924 (89 yrs) Mobile: No mobile phone on file. City/State/Zip: [place] [address] Marital: Unknown Language: English    [facility] [id number] Confucianist: No Confucianist Given-No Anna*     Race: White Ethnicity: Non  Or  O*   EMERGENCY CONTACT   Name Relationship Legal Guardian? Home Phone Work Phone Mobile Phone   1. RICHARD HAND  2.  Meliton Hand Son  Jonn Blackwell      [phone]    [phone]       GUARANTOR  Guarantor: Tressa Dong : 10/23/1924 Home Phone: [phone]   Address: [address] [place] [address] Sex: Female Work Phone:    City/State/Zip: [place], [place]   Rel. to Patient: Self Guarantor ID: [id number]   [address]   Employer:  Status: RETIRED     COVERAGE  PRIMARY INSURANCE   Payor: MEDICARE Plan: MEDICARE PART A&B   Group Number:  Insurance Type: INDEMNITY   Subscriber Name: Noelle De León : 10/23/1924   Subscriber ID: [id number] Pt Rel to Subscriber: Self   SECONDARY INSURANCE   Payor:  Plan:    Group Number:  Insurance Type:    Subscriber Name:  Subscriber :    Subscriber ID:  Pt Rel to Subscriber:    TERTIARY INSURANCE   Payor:  Plan:    Group Number:  Insurance Type:    Subscriber Name: Subscriber :    Subscriber ID:  Pt Rel to Subscriber:    Hospital Account Financial Class: Medicare    2022